# Patient Record
Sex: FEMALE | Race: WHITE
[De-identification: names, ages, dates, MRNs, and addresses within clinical notes are randomized per-mention and may not be internally consistent; named-entity substitution may affect disease eponyms.]

---

## 2017-06-19 ENCOUNTER — HOSPITAL ENCOUNTER (EMERGENCY)
Dept: HOSPITAL 41 - JD.ED | Age: 67
Discharge: HOME | End: 2017-06-19
Payer: MEDICARE

## 2017-06-19 VITALS — SYSTOLIC BLOOD PRESSURE: 100 MMHG | DIASTOLIC BLOOD PRESSURE: 70 MMHG

## 2017-06-19 DIAGNOSIS — F17.210: ICD-10-CM

## 2017-06-19 DIAGNOSIS — I50.9: ICD-10-CM

## 2017-06-19 DIAGNOSIS — F41.9: ICD-10-CM

## 2017-06-19 DIAGNOSIS — Z88.8: ICD-10-CM

## 2017-06-19 DIAGNOSIS — I95.1: Primary | ICD-10-CM

## 2017-06-19 DIAGNOSIS — I25.2: ICD-10-CM

## 2017-06-19 DIAGNOSIS — K21.9: ICD-10-CM

## 2017-06-19 DIAGNOSIS — Z79.82: ICD-10-CM

## 2017-06-19 DIAGNOSIS — M19.90: ICD-10-CM

## 2017-06-19 DIAGNOSIS — F32.9: ICD-10-CM

## 2017-06-19 DIAGNOSIS — J45.909: ICD-10-CM

## 2017-06-19 DIAGNOSIS — Z88.2: ICD-10-CM

## 2017-06-19 PROCEDURE — 36415 COLL VENOUS BLD VENIPUNCTURE: CPT

## 2017-06-19 PROCEDURE — 83880 ASSAY OF NATRIURETIC PEPTIDE: CPT

## 2017-06-19 PROCEDURE — 86140 C-REACTIVE PROTEIN: CPT

## 2017-06-19 PROCEDURE — 96375 TX/PRO/DX INJ NEW DRUG ADDON: CPT

## 2017-06-19 PROCEDURE — 80053 COMPREHEN METABOLIC PANEL: CPT

## 2017-06-19 PROCEDURE — 94664 DEMO&/EVAL PT USE INHALER: CPT

## 2017-06-19 PROCEDURE — 82803 BLOOD GASES ANY COMBINATION: CPT

## 2017-06-19 PROCEDURE — 96361 HYDRATE IV INFUSION ADD-ON: CPT

## 2017-06-19 PROCEDURE — 81001 URINALYSIS AUTO W/SCOPE: CPT

## 2017-06-19 PROCEDURE — 36600 WITHDRAWAL OF ARTERIAL BLOOD: CPT

## 2017-06-19 PROCEDURE — 85025 COMPLETE CBC W/AUTO DIFF WBC: CPT

## 2017-06-19 PROCEDURE — 71010: CPT

## 2017-06-19 PROCEDURE — 96374 THER/PROPH/DIAG INJ IV PUSH: CPT

## 2017-06-19 PROCEDURE — 83605 ASSAY OF LACTIC ACID: CPT

## 2017-06-19 PROCEDURE — 99285 EMERGENCY DEPT VISIT HI MDM: CPT

## 2017-06-19 PROCEDURE — 87040 BLOOD CULTURE FOR BACTERIA: CPT

## 2017-06-19 PROCEDURE — 93005 ELECTROCARDIOGRAM TRACING: CPT

## 2017-06-19 NOTE — EDM.PDOC
ED HPI GENERAL MEDICAL PROBLEM





- General


Chief Complaint: Respiratory Problem


Stated Complaint: SOB/WEAK/CONFUSED


Time Seen by Provider: 06/19/17 14:15


Source of Information: Reports: Patient, Family (daughter)


History Limitations: Reports: No Limitations





- History of Present Illness


INITIAL COMMENTS - FREE TEXT/NARRATIVE: 





66-year-old female presents with her daughter for evaluation and treatment of 

increased confusion, lethargy and malaise. Patient reports that she has not 

been feeling well since Friday. Daughter reports that she went to check on her 

today and she felt that she was more confused than normal lethargic and she had 

an increased effort of breathing. Patient states that she feels more short of 

breath than normal. She is normally on 3 L via nasal cannula of oxygen at home. 

She has end-stage COPD. Patient reports that she has been taking her nebulizers 

and breathing medications as prescribed. She states that she was recently 

started on spree that and Daliresp a few weeks ago. Patient denies any chest 

pain, fevers, coughing, or abdominal pain. Friday morning the patient did feel 

nauseous and had 1 episode of vomiting.





Patient has also had decreased urinary frequency. She has been drinking water. 

Daughter reports 2 weeks ago she may have drank out of a stagnant pond at home.





Patient is currently on narcotics for chronic low back and right hip pain. She 

feels that it is worsening. She is in a pain contract with a pain clinic. Next 

visit is on the 26. She has had previous epidural injections for her chronic 

low back and hip pain.





Daughter reports that she had an echocardiogram in February 2017. She had EF of 

45-55% at that time. In February she was hospitalized and intubated for 

pneumonia and exacerbation.


  ** Lower Back


Pain Score (Numeric/FACES): 3





- Related Data


 Allergies











Allergy/AdvReac Type Severity Reaction Status Date / Time


 


Sulfa (Sulfonamide Allergy  Cannot Verified 06/19/17 14:15





Antibiotics)   Remember  


 


sunflower oil Allergy  Anaphylactic Verified 06/19/17 14:15





   Shock  


 


sunflower seed Allergy  Hives Verified 06/19/17 14:15


 


alendronate sodium AdvReac  Nausea Verified 06/19/17 14:15





[From Fosamax]     


 


duloxetine HCl AdvReac  Nausea and Verified 06/19/17 14:15





[From Cymbalta]   Vomiting  


 


erythromycin base AdvReac  Nausea Verified 06/19/17 14:15


 


pregabalin [From Lyrica] AdvReac  Pain Verified 06/19/17 14:15











Home Meds: 


 Home Meds





ARIPiprazole [Abilify] 2 mg PO DAILY 11/12/15 [History]


Furosemide [Lasix] 40 mg PO BID 11/12/15 [History]


Albuterol Sulfate [Proair Respiclick] 2 puff INH Q4H PRN 06/06/16 [History]


predniSONE 5 mg PO DAILY 06/06/16 [History]


Escitalopram Oxalate 10 mg PO DAILY 06/16/16 [History]


Fluticasone/Salmeterol [Advair Diskus 500-50] 1 puff INH BID 06/16/16 [History]


Gabapentin [Neurontin] 300 mg PO TID 06/16/16 [History]


Aspirin/Caffeine [Yancy Back & Body Caplet] 2 tab PO Q6H 01/11/17 [History]


oxyCODONE 10 mg PO Q6H PRN 01/12/17 [History]


Acetaminophen [Tylenol Arthritis] 1,500 mg PO Q6H PRN #0  01/14/17 [Rx]


Albuterol/Ipratropium [DuoNeb 3.0-0.5 MG/3 ML] 1 inh NEB Q6H PRN 01/30/17 [

History]


Diclofenac Sodium [Voltaren 1% Gel] 1 applic TOP ASDIRECTED PRN 01/30/17 [

History]


Carvedilol [Coreg] 3.125 mg PO BIDMEALS 06/19/17 [History]


Metoclopramide HCl [Reglan] 5 mg PO Q8HR PRN #12 tablet 06/19/17 [Rx]


Metoprolol Succinate [Toprol XL] 25 mg PO DAILY 06/19/17 [History]


Nitroglycerin 0.4 mg .ROUTE ASDIRECTED PRN 06/19/17 [History]


Pantoprazole [ProTONIX***] 40 mg PO DAILY 06/19/17 [History]


Potassium Chloride 40 meq PO DAILY 06/19/17 [History]


Roflumilast [Daliresp] 500 mcg PO DAILY 06/19/17 [History]


Rosuvastatin [Crestor] 5 mg PO DAILY 06/19/17 [History]


Tiotropium [Spiriva HandiHaler] 18 mcg INH DAILY 06/19/17 [History]











Past Medical History


HEENT History: Reports: Other (See Below)


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


Cardiovascular History: Reports: Heart Failure, MI, Other (See Below)


Other Cardiovascular History: hypotension, cardiomegaly


Respiratory History: Reports: Asthma, COPD, Pneumonia, Recurrent, Other (See 

Below)


Other Respiratory History: wears oxygen at home 3L


Gastrointestinal History: Reports: Chronic Constipation, GERD


Genitourinary History: Reports: Other (See Below)


Other Genitourinary History: stress incontinence


OB/GYN History: Reports: Pregnancy


Musculoskeletal History: Reports: Osteoarthritis, Osteoporosis


Other Musculoskeletal History: 3 herniated disc to cervical spine C2 C3  C5 C6 

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

, lumbar degneration, wrist fracture with hardware


Neurological History: Reports: None


Psychiatric History: Reports: Addiction, Anxiety, Depression


Endocrine/Metabolic History: Reports: None


Hematologic History: Reports: Anemia


Immunologic History: Reports: None


Oncologic (Cancer) History: Reports: Other (See Below)


Other Oncologic History: vocal cord ca


Dermatologic History: Reports: Cellulitis





- Infectious Disease History


Infectious Disease History: Reports: C-Difficile, Shingles





- Past Surgical History


Female  Surgical History: Reports: None, Other (See Below)


Musculoskeletal Surgical History: Reports: Other (See Below)





Social & Family History





- Family History


Family Medical History: Noncontributory


Cardiac: Reports: Heart Failure, MI


Respiratory: Reports: COPD





- Tobacco Use


Smoking Status *Q: Current Every Day Smoker


Years of Tobacco use: 46


Packs/Tins Daily: 0.8


Used Tobacco, but Quit: No


Month Tobacco Last Used: 12/16


Second Hand Smoke Exposure: No





- Caffeine Use


Caffeine Use: Reports: Tea





- Recreational Drug Use


Recreational Drug Use: No





- Living Situation & Occupation


Living situation: Reports: Other


Occupation: Retired





ED ROS GENERAL





- Review of Systems


Review Of Systems: See Below


Constitutional: Reports: Weakness, Fatigue, Other (increased confusion).  Denies

: Fever


Respiratory: Reports: Shortness of Breath.  Denies: Cough


Cardiovascular: Denies: Chest Pain


GI/Abdominal: Reports: Nausea, Vomiting (x1 episode ).  Denies: Abdominal Pain


: Denies: Dysuria, Frequency


Musculoskeletal: Reports: Back Pain, Other (chronic hip pain)





ED EXAM, GENERAL





- Physical Exam


Exam: See Below


Exam Limited By: No Limitations


General Appearance: Alert, WD/WN, Mild Distress, Thin


Ears: Normal External Exam


Nose: Normal Inspection.  No: Nasal Flaring


Throat/Mouth: Normal Inspection, Normal Lips, Normal Oropharynx, Normal Voice, 

No Airway Compromise.  No: Perioral Cyanosis


Neck: Normal Inspection


Respiratory/Chest: Decreased Breath Sounds (throughout), Wheezing (diffuse 

expiratory).  No: Crackles, Rhonchi


Cardiovascular: Normal Peripheral Pulses, Regular Rate, Rhythm


GI/Abdominal: Soft, Non-Tender


Neurological: Alert, Oriented, Normal Cognition


Psychiatric: Normal Affect, Normal Mood


Skin Exam: Warm, Dry, Normal Color





EKG INTERPRETATION


EKG Date: 06/19/17


Time: 15:05


Rhythm: NSR


Rate (Beats/Min): 80


Axis: Normal


P-Wave: Present


QRS: Normal


ST-T: Normal


QT: Normal


EKG Interpretation Comments: 





NSR at 80 bpm.


Near Q waves in V1 and V2 - consider old anteroseptal MI. 


Borderline LVH pattern. 


Nonspecific interventricular conduction delay.


Reviewed by myself and Dr. Mendez. 





Course





- Vital Signs


Last Recorded V/S: 


 Last Vital Signs











Temp  36.5 C   06/19/17 14:11


 


Pulse  82   06/19/17 19:29


 


Resp  18   06/19/17 19:29


 


BP  100/70   06/19/17 19:29


 


Pulse Ox  98   06/19/17 19:29








 





Orthostatic Blood Pressure [     85/64


Standing]                        


Orthostatic Blood Pressure [     89/56


Sitting]                         


Orthostatic Blood Pressure [     103/63


Supine]                          











- Orders/Labs/Meds


Labs: 


 Laboratory Tests











  06/19/17 06/19/17 06/19/17 Range/Units





  15:10 15:10 15:10 


 


WBC  7.59    (3.98-10.04)  K/mm3


 


RBC  4.35    (3.98-5.22)  M/mm3


 


Hgb  12.3    (11.2-15.7)  gm/L


 


Hct  39.8    (34.1-44.9)  %


 


MCV  91.5    (79.4-94.8)  fl


 


MCH  28.3    (25.6-32.2)  pg


 


MCHC  30.9 L    (32.2-35.5)  g/dl


 


RDW Std Deviation  51.4 H    (36.4-46.3)  fL


 


Plt Count  357    (182-369)  K/mm3


 


MPV  9.6    (9.4-12.3)  fl


 


Neut % (Auto)  67.2    (34.0-71.1)  %


 


Lymph % (Auto)  21.1    (19.3-51.7)  %


 


Mono % (Auto)  9.4    (4.7-12.5)  %


 


Eos % (Auto)  1.7    (0.7-5.8)  


 


Baso % (Auto)  0.3    (0.1-1.2)  %


 


Neut # (Auto)  5.11    (1.56-6.13)  K/mm3


 


Lymph # (Auto)  1.60    (1.18-3.74)  K/mm3


 


Mono # (Auto)  0.71 H    (0.24-0.36)  K/mm3


 


Eos # (Auto)  0.13    (0.04-0.36)  K/mm3


 


Baso # (Auto)  0.02    (0.01-0.08)  K/mm3


 


Puncture Site     


 


ABG pH     (7.35-7.45)  


 


ABG pCO2     (35.0-45.0)  mmHg


 


ABG pO2     (80.0-100.0)  mmHg


 


ABG HCO3     (22.0-26.0)  meq/L


 


ABG O2 Saturation     (96.0-97.0)  %


 


ABG Base Excess     (-2-2.0)  


 


A-a Gradient     mmHg


 


O2 Delivery Device     


 


Oxygen Flow Rate     


 


FiO2     (21..00)  %


 


Sodium   142   (136-145)  mEq/L


 


Potassium   3.5   (3.5-5.1)  mEq/L


 


Chloride   101   ()  mEq/L


 


Carbon Dioxide   33 H   (21-32)  mEq/L


 


Anion Gap   11.5   (5-15)  


 


BUN   16   (7-18)  mg/dL


 


Creatinine   0.7   (0.55-1.02)  mg/dL


 


Est Cr Clr Drug Dosing   61.98   mL/min


 


Estimated GFR (MDRD)   > 60   (>60)  mL/min


 


BUN/Creatinine Ratio   22.9 H   (14-18)  


 


Glucose   86   ()  mg/dL


 


Lactic Acid    0.7  (0.4-2.0)  mmol/L


 


Calcium   8.7   (8.5-10.1)  mg/dL


 


Total Bilirubin   0.2   (0.2-1.0)  mg/dL


 


AST   29   (15-37)  U/L


 


ALT   34   (14-59)  U/L


 


Alkaline Phosphatase   53   ()  U/L


 


C-Reactive Protein   0.6   (<1.0)  mg/dL


 


B-Natriuretic Peptide     (0-100)  pg/mL


 


Total Protein   7.4   (6.4-8.2)  g/dl


 


Albumin   3.9   (3.4-5.0)  g/dl


 


Globulin   3.5   gm/dL


 


Albumin/Globulin Ratio   1.1   (1-2)  


 


Urine Color     (Yellow)  


 


Urine Appearance     (Clear)  


 


Urine pH     (5.0-8.0)  


 


Ur Specific Gravity     (1.005-1.030)  


 


Urine Protein     (Negative)  


 


Urine Glucose (UA)     (Negative)  


 


Urine Ketones     (Negative)  


 


Urine Occult Blood     (Negative)  


 


Urine Nitrite     (Negative)  


 


Urine Bilirubin     (Negative)  


 


Urine Urobilinogen     (0.2-1.0)  


 


Ur Leukocyte Esterase     (Negative)  


 


Urine RBC     (0-5)  /hpf


 


Urine WBC     (0-5)  /hpf


 


Ur Epithelial Cells     (0-5)  /hpf


 


Urine Bacteria     (FEW)  /hpf


 


Urine Mucus     (FEW)  /hpf














  06/19/17 06/19/17 06/19/17 Range/Units





  15:10 15:15 15:40 


 


WBC     (3.98-10.04)  K/mm3


 


RBC     (3.98-5.22)  M/mm3


 


Hgb     (11.2-15.7)  gm/L


 


Hct     (34.1-44.9)  %


 


MCV     (79.4-94.8)  fl


 


MCH     (25.6-32.2)  pg


 


MCHC     (32.2-35.5)  g/dl


 


RDW Std Deviation     (36.4-46.3)  fL


 


Plt Count     (182-369)  K/mm3


 


MPV     (9.4-12.3)  fl


 


Neut % (Auto)     (34.0-71.1)  %


 


Lymph % (Auto)     (19.3-51.7)  %


 


Mono % (Auto)     (4.7-12.5)  %


 


Eos % (Auto)     (0.7-5.8)  


 


Baso % (Auto)     (0.1-1.2)  %


 


Neut # (Auto)     (1.56-6.13)  K/mm3


 


Lymph # (Auto)     (1.18-3.74)  K/mm3


 


Mono # (Auto)     (0.24-0.36)  K/mm3


 


Eos # (Auto)     (0.04-0.36)  K/mm3


 


Baso # (Auto)     (0.01-0.08)  K/mm3


 


Puncture Site   Rt radial   


 


ABG pH   7.36   (7.35-7.45)  


 


ABG pCO2   44.7   (35.0-45.0)  mmHg


 


ABG pO2   68.0 L   (80.0-100.0)  mmHg


 


ABG HCO3   24.6   (22.0-26.0)  meq/L


 


ABG O2 Saturation   94.0 L   (96.0-97.0)  %


 


ABG Base Excess   -0.5   (-2-2.0)  


 


A-a Gradient   81   mmHg


 


O2 Delivery Device   Nasal cannula   


 


Oxygen Flow Rate   3.0   


 


FiO2   0.00 L   (21..00)  %


 


Sodium     (136-145)  mEq/L


 


Potassium     (3.5-5.1)  mEq/L


 


Chloride     ()  mEq/L


 


Carbon Dioxide     (21-32)  mEq/L


 


Anion Gap     (5-15)  


 


BUN     (7-18)  mg/dL


 


Creatinine     (0.55-1.02)  mg/dL


 


Est Cr Clr Drug Dosing     mL/min


 


Estimated GFR (MDRD)     (>60)  mL/min


 


BUN/Creatinine Ratio     (14-18)  


 


Glucose     ()  mg/dL


 


Lactic Acid     (0.4-2.0)  mmol/L


 


Calcium     (8.5-10.1)  mg/dL


 


Total Bilirubin     (0.2-1.0)  mg/dL


 


AST     (15-37)  U/L


 


ALT     (14-59)  U/L


 


Alkaline Phosphatase     ()  U/L


 


C-Reactive Protein     (<1.0)  mg/dL


 


B-Natriuretic Peptide  < 15    (0-100)  pg/mL


 


Total Protein     (6.4-8.2)  g/dl


 


Albumin     (3.4-5.0)  g/dl


 


Globulin     gm/dL


 


Albumin/Globulin Ratio     (1-2)  


 


Urine Color    Yellow  (Yellow)  


 


Urine Appearance    Clear  (Clear)  


 


Urine pH    5.5  (5.0-8.0)  


 


Ur Specific Gravity    1.025  (1.005-1.030)  


 


Urine Protein    Negative  (Negative)  


 


Urine Glucose (UA)    Negative  (Negative)  


 


Urine Ketones    1+ H  (Negative)  


 


Urine Occult Blood    Trace-intact H  (Negative)  


 


Urine Nitrite    Negative  (Negative)  


 


Urine Bilirubin    Negative  (Negative)  


 


Urine Urobilinogen    0.2  (0.2-1.0)  


 


Ur Leukocyte Esterase    Negative  (Negative)  


 


Urine RBC    0-5  (0-5)  /hpf


 


Urine WBC    0-5  (0-5)  /hpf


 


Ur Epithelial Cells    0-5  (0-5)  /hpf


 


Urine Bacteria    Not seen  (FEW)  /hpf


 


Urine Mucus    Not seen  (FEW)  /hpf











Meds: 


Medications














Discontinued Medications














Generic Name Dose Route Start Last Admin





  Trade Name Freq  PRN Reason Stop Dose Admin


 


Albuterol/Ipratropium  3 ml  06/19/17 14:41  





  Duoneb 3.0-0.5 Mg/3 Ml  NEB  06/19/17 14:42  





  ONETIME ONE   


 


Hydromorphone HCl  1 mg  06/19/17 15:50  06/19/17 16:07





  Dilaudid  IVPUSH  06/19/17 15:51  Not Given





  ONETIME ONE   


 


Hydromorphone HCl  0.5 mg  06/19/17 15:57  06/19/17 16:01





  Dilaudid  IVPUSH  06/19/17 15:58  0.5 mg





  ONETIME ONE   Administration


 


Sodium Chloride  500 mls @ 500 mls/hr  06/19/17 14:41  06/19/17 15:08





  Normal Saline  IV  06/19/17 15:40  500 mls/hr





  ONETIME ONE   Administration


 


Sodium Chloride  1,000 mls @ 999 mls/hr  06/19/17 15:59  06/19/17 16:06





  Normal Saline  IV  06/19/17 16:59  999 mls/hr





  ONETIME ONE   Administration


 


Ondansetron HCl  4 mg  06/19/17 17:53  06/19/17 18:05





  Zofran  IVPUSH  06/19/17 17:54  4 mg





  ONETIME ONE   Administration


 


Sodium Chloride  10 ml  06/19/17 14:41  06/19/17 15:08





  Saline Flush  FLUSH   10 ml





  ASDIRECTED PRN   Administration





  Keep Vein Open   














- Radiology Interpretation


Free Text/Narrative:: 





chest xray shows hyperinflation. No acute changes. Reviewed by myself and Dr. Mendez





- Re-Assessments/Exams


Free Text/Narrative Re-Assessment/Exam: 





06/19/17 18:11


Labs returned.


WBC is 7.59, hgb is 12.3 and plts are 357


pH is 7.36, pO2 is 68, pCO2 is 44.7 and bicarb is 24.6


sodium is 142, potassium is 3.5, chloride is 101. Anion gap is 11.5. 


BNP is <15


CRP is 0.6


UA has 1+ ketones and trace intact blood





I discussed the case with Dr. Mendez. Recommended follow-up with PCP and 

pulmonology.





I discussed labs and imaging with the patient.





At this point I do not see any reason for admission. The patient has done well 

on 3 L via nasal cannula. Her lab studies are unremarkable. I discussed with 

her daughter options. If she is uncomfortable taking her home we could offer 

admission however, it may be for an observation admission. She would also 

likely be encouraged to go to a nursing home as she is unable to care for 

himself at home. Daughter does not want this and will take her home. I 

encouraged him to contact her pulmonologist and her pain clinic. I recommend 

they discuss her new medication changes. Her symptoms today could be from her 

recent medication changes.





Discharge instructions as documented.














Departure





- Departure


Time of Disposition: 18:11


Disposition: Home, Self-Care 01


Condition: Fair


Clinical Impression: 


 Orthostatic hypotension








- Discharge Information


Prescriptions: 


Metoclopramide HCl [Reglan] 5 mg PO Q8HR PRN #12 tablet


 PRN Reason: Nausea


Instructions:  Orthostatic Hypotension


Referrals: 


Nancy Gibbons NP [Primary Care Provider] - 


Forms:  ED Department Discharge


Additional Instructions: 


Close follow-up with your primary care provider Wednesday or Thursday this 

week. 





Reglan 1 tab every 8 hours as needed for nausea.





Rest.





Stop the Coreg.





Please return to the ER if your symptoms change or worsen.

## 2017-06-20 NOTE — CR
Chest: Portable view of the chest was obtained.

 

Comparison: Previous chest x-ray of 02/13/17.

 

Heart size and mediastinum are within normal limits.  Focal density 

noted within the right upper lung believed to represent an area of 

scarring.  Lungs otherwise are clear but somewhat hyperinflated.  Bony

 structures are osteopenic but grossly intact.  Previous 

cholecystectomy is noted.  Probable emphysematous change is present.

 

Impression:

1.  Density within the right upper chest most likely representing area

 of scarring.  Probable emphysematous change.

2.  Other incidental findings.  Nothing acute is definitely 

appreciated.

 

Diagnostic code #3

## 2017-07-28 ENCOUNTER — HOSPITAL ENCOUNTER (EMERGENCY)
Dept: HOSPITAL 41 - JD.ED | Age: 67
Discharge: HOME | End: 2017-07-28
Payer: MEDICARE

## 2017-07-28 VITALS — SYSTOLIC BLOOD PRESSURE: 120 MMHG | DIASTOLIC BLOOD PRESSURE: 75 MMHG

## 2017-07-28 DIAGNOSIS — Z88.1: ICD-10-CM

## 2017-07-28 DIAGNOSIS — I25.2: ICD-10-CM

## 2017-07-28 DIAGNOSIS — Z79.899: ICD-10-CM

## 2017-07-28 DIAGNOSIS — F32.9: ICD-10-CM

## 2017-07-28 DIAGNOSIS — Z91.018: ICD-10-CM

## 2017-07-28 DIAGNOSIS — Z88.8: ICD-10-CM

## 2017-07-28 DIAGNOSIS — F17.210: ICD-10-CM

## 2017-07-28 DIAGNOSIS — Z88.2: ICD-10-CM

## 2017-07-28 DIAGNOSIS — Z79.82: ICD-10-CM

## 2017-07-28 DIAGNOSIS — K21.9: ICD-10-CM

## 2017-07-28 DIAGNOSIS — M19.90: ICD-10-CM

## 2017-07-28 DIAGNOSIS — F41.9: ICD-10-CM

## 2017-07-28 DIAGNOSIS — J44.1: Primary | ICD-10-CM

## 2017-07-28 DIAGNOSIS — I50.9: ICD-10-CM

## 2017-07-28 DIAGNOSIS — M81.0: ICD-10-CM

## 2017-07-28 DIAGNOSIS — J45.909: ICD-10-CM

## 2017-07-28 PROCEDURE — 94664 DEMO&/EVAL PT USE INHALER: CPT

## 2017-07-28 PROCEDURE — 80053 COMPREHEN METABOLIC PANEL: CPT

## 2017-07-28 PROCEDURE — 83880 ASSAY OF NATRIURETIC PEPTIDE: CPT

## 2017-07-28 PROCEDURE — 86140 C-REACTIVE PROTEIN: CPT

## 2017-07-28 PROCEDURE — 71010: CPT

## 2017-07-28 PROCEDURE — 96365 THER/PROPH/DIAG IV INF INIT: CPT

## 2017-07-28 PROCEDURE — 36415 COLL VENOUS BLD VENIPUNCTURE: CPT

## 2017-07-28 PROCEDURE — 96375 TX/PRO/DX INJ NEW DRUG ADDON: CPT

## 2017-07-28 PROCEDURE — 85025 COMPLETE CBC W/AUTO DIFF WBC: CPT

## 2017-07-28 PROCEDURE — 94640 AIRWAY INHALATION TREATMENT: CPT

## 2017-07-28 PROCEDURE — 99285 EMERGENCY DEPT VISIT HI MDM: CPT

## 2017-07-28 NOTE — EDM.PDOC
ED HPI GENERAL MEDICAL PROBLEM





- General


Chief Complaint: General


Stated Complaint: respiratory PROBLEMS


Time Seen by Provider: 07/28/17 00:54


Source of Information: Reports: Patient, RN Notes Reviewed





- History of Present Illness


INITIAL COMMENTS - FREE TEXT/NARRATIVE: 





67-year-old male by daughter for evaluation of shortness of breath, coughing, 

generalized weakness. She does have known long-standing history of COPD. Her 

daughter tells me that she has started smoking again. Patient states that the 

cough is worsened over the last few days. She is now coughing up yellowish 

colored phlegm. She is not aware of running fever. She does have a nebulizer at 

home but states she has not been using that regularly. She states she has not 

used it in the past day. She denies sore throat other than the discomfort of 

coughing. She denies major nasal or sinus congestion at this time. She's had 

decreased appetite. No current abdominal pain or vomiting. No leg swelling or 

discomfort.





- Related Data


 Allergies











Allergy/AdvReac Type Severity Reaction Status Date / Time


 


Sulfa (Sulfonamide Allergy  Cannot Verified 07/28/17 00:42





Antibiotics)   Remember  


 


sunflower oil Allergy  Anaphylactic Verified 07/28/17 00:42





   Shock  


 


sunflower seed Allergy  Hives Verified 07/28/17 00:42


 


alendronate sodium AdvReac  Nausea Verified 07/28/17 00:42





[From Fosamax]     


 


duloxetine HCl AdvReac  Nausea and Verified 07/28/17 00:42





[From Cymbalta]   Vomiting  


 


erythromycin base AdvReac  Nausea Verified 07/28/17 00:42


 


pregabalin [From Lyrica] AdvReac  Pain Verified 07/28/17 00:42











Home Meds: 


 Home Meds





ARIPiprazole [Abilify] 2 mg PO DAILY 11/12/15 [History]


Furosemide [Lasix] 40 mg PO BID 11/12/15 [History]


Albuterol Sulfate [Proair Respiclick] 2 puff INH Q4H PRN 06/06/16 [History]


predniSONE 5 mg PO DAILY 06/06/16 [History]


Escitalopram Oxalate 10 mg PO DAILY 06/16/16 [History]


Fluticasone/Salmeterol [Advair Diskus 500-50] 1 puff INH BID 06/16/16 [History]


Gabapentin [Neurontin] 300 mg PO TID 06/16/16 [History]


Aspirin/Caffeine [Yancy Back & Body Caplet] 2 tab PO Q6H 01/11/17 [History]


oxyCODONE 10 mg PO Q6H PRN 01/12/17 [History]


Acetaminophen [Tylenol Arthritis] 1,500 mg PO Q6H PRN #0  01/14/17 [Rx]


Albuterol/Ipratropium [DuoNeb 3.0-0.5 MG/3 ML] 1 inh NEB Q6H PRN 01/30/17 [

History]


Diclofenac Sodium [Voltaren 1% Gel] 1 applic TOP ASDIRECTED PRN 01/30/17 [

History]


Carvedilol [Coreg] 3.125 mg PO BIDMEALS 06/19/17 [History]


Metoclopramide HCl [Reglan] 5 mg PO Q8HR PRN #12 tablet 06/19/17 [Rx]


Metoprolol Succinate [Toprol XL] 25 mg PO DAILY 06/19/17 [History]


Nitroglycerin 0.4 mg .ROUTE ASDIRECTED PRN 06/19/17 [History]


Pantoprazole [ProTONIX***] 40 mg PO DAILY 06/19/17 [History]


Potassium Chloride 40 meq PO DAILY 06/19/17 [History]


Roflumilast [Daliresp] 500 mcg PO DAILY 06/19/17 [History]


Rosuvastatin [Crestor] 5 mg PO DAILY 06/19/17 [History]


Tiotropium [Spiriva HandiHaler] 18 mcg INH DAILY 06/19/17 [History]


Levofloxacin [Levaquin] 500 mg PO Q24H #7 tablet 07/28/17 [Rx]











Past Medical History


HEENT History: Reports: Other (See Below)


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


Cardiovascular History: Reports: Heart Failure, MI, Other (See Below)


Other Cardiovascular History: hypotension, cardiomegaly


Respiratory History: Reports: Asthma, COPD, Pneumonia, Recurrent, Other (See 

Below)


Other Respiratory History: wears oxygen at home 3L


Gastrointestinal History: Reports: Chronic Constipation, GERD


Genitourinary History: Reports: Other (See Below)


Other Genitourinary History: stress incontinence


OB/GYN History: Reports: Pregnancy


Musculoskeletal History: Reports: Osteoarthritis, Osteoporosis


Other Musculoskeletal History: 3 herniated disc to cervical spine C2 C3  C5 C6 

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

, lumbar degneration, wrist fracture with hardware


Neurological History: Reports: None


Psychiatric History: Reports: Addiction, Anxiety, Depression


Endocrine/Metabolic History: Reports: None


Hematologic History: Reports: Anemia


Immunologic History: Reports: None


Oncologic (Cancer) History: Reports: Other (See Below)


Other Oncologic History: vocal cord ca


Dermatologic History: Reports: Cellulitis





- Infectious Disease History


Infectious Disease History: Reports: C-Difficile, Shingles





- Past Surgical History


Female  Surgical History: Reports: None, Other (See Below)


Musculoskeletal Surgical History: Reports: Other (See Below)





Social & Family History





- Family History


Family Medical History: Noncontributory


Cardiac: Reports: Heart Failure, MI


Respiratory: Reports: COPD





- Tobacco Use


Smoking Status *Q: Current Every Day Smoker


Years of Tobacco use: 35


Packs/Tins Daily: 1


Used Tobacco, but Quit: No


Month Tobacco Last Used: 12/16


Second Hand Smoke Exposure: No





- Caffeine Use


Caffeine Use: Reports: Tea





- Recreational Drug Use


Recreational Drug Use: No





- Living Situation & Occupation


Living situation: Reports: Other


Occupation: Retired





ED ROS GENERAL





- Review of Systems


Review Of Systems: See Below


Constitutional: Denies: Fever, Chills, Diaphoresis


HEENT: Denies: Sinus Problem, Throat Pain


Respiratory: Reports: Shortness of Breath, Wheezing, Cough, Sputum (Yellow 

colored).  Denies: Pleuritic Chest Pain


Cardiovascular: Reports: Chest Pain (With coughing)


Endocrine: Reports: Fatigue


GI/Abdominal: Reports: Decreased Appetite.  Denies: Abdominal Pain, Nausea, 

Vomiting


Musculoskeletal: Reports: No Symptoms


Skin: Reports: No Symptoms


Neurological: Reports: Weakness (Generalized).  Denies: Trouble Speaking





ED EXAM, GENERAL





- Physical Exam


Exam: See Below


General Appearance: Alert, No Apparent Distress


Eye Exam: Bilateral Eye: PERRL


Nose: Normal Inspection


Throat/Mouth: Normal Inspection, Normal Oropharynx


Head: Atraumatic.  No: Facial Swelling


Neck: Supple, Full Range of Motion, Other (No JVD)


Respiratory/Chest: Respiratory Distress (Mild tachypnea), Wheezing (Mild 

bilateral).  No: Rales, Rhonchi


Cardiovascular: Regular Rate, Rhythm


GI/Abdominal: Soft, Non-Tender


Back Exam: No: CVA Tenderness (L), CVA Tenderness (R)


Extremities: No: Pedal Edema, Leg Pain


Neurological: Alert, Oriented, No Motor/Sensory Deficits


Skin Exam: Warm, Dry, Normal Color





Course





- Vital Signs


Last Recorded V/S: 


 Last Vital Signs











Temp  97.0 F   07/28/17 00:43


 


Pulse  90   07/28/17 08:00


 


Resp  18   07/28/17 08:00


 


BP  120/75   07/28/17 08:00


 


Pulse Ox  96   07/28/17 08:00














- Orders/Labs/Meds


Labs: 


 Laboratory Tests











  07/28/17 07/28/17 07/28/17 Range/Units





  01:29 01:29 02:10 


 


WBC  13.13 H    (3.98-10.04)  K/mm3


 


RBC  4.03    (3.98-5.22)  M/mm3


 


Hgb  11.3    (11.2-15.7)  gm/L


 


Hct  36.8    (34.1-44.9)  %


 


MCV  91.3    (79.4-94.8)  fl


 


MCH  28.0    (25.6-32.2)  pg


 


MCHC  30.7 L    (32.2-35.5)  g/dl


 


RDW Std Deviation  52.3 H    (36.4-46.3)  fL


 


Plt Count  323    (182-369)  K/mm3


 


MPV  9.7    (9.4-12.3)  fl


 


Neut % (Auto)  81.7 H    (34.0-71.1)  %


 


Lymph % (Auto)  8.8 L    (19.3-51.7)  %


 


Mono % (Auto)  6.9    (4.7-12.5)  %


 


Eos % (Auto)  2.2    (0.7-5.8)  


 


Baso % (Auto)  0.2    (0.1-1.2)  %


 


Neut # (Auto)  10.73 H    (1.56-6.13)  K/mm3


 


Lymph # (Auto)  1.16 L    (1.18-3.74)  K/mm3


 


Mono # (Auto)  0.90 H    (0.24-0.36)  K/mm3


 


Eos # (Auto)  0.29    (0.04-0.36)  K/mm3


 


Baso # (Auto)  0.02    (0.01-0.08)  K/mm3


 


Manual Slide Review  Abnormal smear    


 


Sodium     (136-145)  mEq/L


 


Potassium     (3.5-5.1)  mEq/L


 


Chloride     ()  mEq/L


 


Carbon Dioxide     (21-32)  mEq/L


 


Anion Gap     (5-15)  


 


BUN     (7-18)  mg/dL


 


Creatinine     (0.55-1.02)  mg/dL


 


Est Cr Clr Drug Dosing     


 


Estimated GFR (MDRD)     (>60)  mL/min


 


BUN/Creatinine Ratio     (14-18)  


 


Glucose     ()  mg/dL


 


Calcium     (8.5-10.1)  mg/dL


 


Total Bilirubin     (0.2-1.0)  mg/dL


 


AST     (15-37)  U/L


 


ALT     (14-59)  U/L


 


Alkaline Phosphatase     ()  U/L


 


C-Reactive Protein    19.2 H*  (<1.0)  mg/dL


 


B-Natriuretic Peptide   < 15   (0-100)  pg/mL


 


Total Protein     (6.4-8.2)  g/dl


 


Albumin     (3.4-5.0)  g/dl


 


Globulin     gm/dL


 


Albumin/Globulin Ratio     (1-2)  














  07/28/17 Range/Units





  02:10 


 


WBC   (3.98-10.04)  K/mm3


 


RBC   (3.98-5.22)  M/mm3


 


Hgb   (11.2-15.7)  gm/L


 


Hct   (34.1-44.9)  %


 


MCV   (79.4-94.8)  fl


 


MCH   (25.6-32.2)  pg


 


MCHC   (32.2-35.5)  g/dl


 


RDW Std Deviation   (36.4-46.3)  fL


 


Plt Count   (182-369)  K/mm3


 


MPV   (9.4-12.3)  fl


 


Neut % (Auto)   (34.0-71.1)  %


 


Lymph % (Auto)   (19.3-51.7)  %


 


Mono % (Auto)   (4.7-12.5)  %


 


Eos % (Auto)   (0.7-5.8)  


 


Baso % (Auto)   (0.1-1.2)  %


 


Neut # (Auto)   (1.56-6.13)  K/mm3


 


Lymph # (Auto)   (1.18-3.74)  K/mm3


 


Mono # (Auto)   (0.24-0.36)  K/mm3


 


Eos # (Auto)   (0.04-0.36)  K/mm3


 


Baso # (Auto)   (0.01-0.08)  K/mm3


 


Manual Slide Review   


 


Sodium  140  (136-145)  mEq/L


 


Potassium  4.3  (3.5-5.1)  mEq/L


 


Chloride  100  ()  mEq/L


 


Carbon Dioxide  34 H  (21-32)  mEq/L


 


Anion Gap  10.3  (5-15)  


 


BUN  22 H  (7-18)  mg/dL


 


Creatinine  0.6  (0.55-1.02)  mg/dL


 


Est Cr Clr Drug Dosing  TNP  


 


Estimated GFR (MDRD)  > 60  (>60)  mL/min


 


BUN/Creatinine Ratio  36.7 H  (14-18)  


 


Glucose  104  ()  mg/dL


 


Calcium  8.9  (8.5-10.1)  mg/dL


 


Total Bilirubin  0.2  (0.2-1.0)  mg/dL


 


AST  17  (15-37)  U/L


 


ALT  24  (14-59)  U/L


 


Alkaline Phosphatase  63  ()  U/L


 


C-Reactive Protein   (<1.0)  mg/dL


 


B-Natriuretic Peptide   (0-100)  pg/mL


 


Total Protein  7.4  (6.4-8.2)  g/dl


 


Albumin  3.7  (3.4-5.0)  g/dl


 


Globulin  3.7  gm/dL


 


Albumin/Globulin Ratio  1.0  (1-2)  











Meds: 


Medications














Discontinued Medications














Generic Name Dose Route Start Last Admin





  Trade Name Freq  PRN Reason Stop Dose Admin


 


Acetaminophen  975 mg  07/28/17 06:49  07/28/17 06:57





  Tylenol  PO  07/28/17 06:50  975 mg





  NOW ONE   Administration


 


Albuterol  2.5 mg  07/28/17 06:37  07/28/17 06:51





  Proventil Neb Soln  [facility]  07/28/17 06:38  2.5 mg





  ONETIME ONE   Administration


 


Albuterol/Ipratropium  3 ml  07/28/17 01:20  07/28/17 01:34





  Duoneb 3.0-0.5 Mg/3 Ml  NEB  07/28/17 01:21  3 ml





  ONETIME ONE   Administration


 


Levofloxacin/Dextrose 750 mg/  150 mls @ 100 mls/hr  07/28/17 03:04  07/28/17 03

:46





  Premix  IV  07/28/17 04:33  100 mls/hr





  ONETIME ONE   Administration


 


Methylprednisolone Sodium Succinate  125 mg  07/28/17 01:20  07/28/17 01:31





  Solu-Medrol  IVPUSH  07/28/17 01:21  125 mg





  ONETIME ONE   Administration


 


Prednisone  40 mg  07/28/17 06:53  





  Prednisone  PO  07/28/17 06:54  





  NOW ONE   


 


Prednisone  40 mg  07/28/17 06:56  07/28/17 06:57





  Prednisone  PO  07/28/17 06:57  40 mg





  ONETIME ONE   Administration


 


Prednisone  Confirm  07/28/17 06:57  





  Prednisone  Administered  07/28/17 06:58  





  Dose   





  40 mg   





  .ROUTE   





  .STK-MED ONE   


 


Sodium Chloride  10 ml  07/28/17 01:14  07/28/17 01:31





  Saline Flush  FLUSH   10 ml





  ASDIRECTED PRN   Administration





  Keep Vein Open   














- Re-Assessments/Exams


Free Text/Narrative Re-Assessment/Exam: 





07/28/17 02:47


Chest x-ray shows some mild hyperinflation, no apparent acute infiltrate, lung 

markings similar to prior chest x-ray on record





Departure





- Departure


Time of Disposition: 08:00


Disposition: Home, Self-Care 01


Condition: Fair


Clinical Impression: 


 COPD exacerbation








- Discharge Information


Prescriptions: 


Levofloxacin [Levaquin] 500 mg PO Q24H #7 tablet


Instructions:  Chronic Obstructive Pulmonary Disease Exacerbation


Referrals: 


Nancy Gibbons NP [Primary Care Provider] - 


Forms:  ED Department Discharge


Additional Instructions: 


You need to try hard to stop smoking, you have been given a dose of Levaquin 

750 mg IV while here in the emergency department. Continue that 500 mg daily 

for the next week. Your next dose of Levaquin should be tomorrow morning.  

Prednisone 40 mg every morning for 3 days and then 20 mg every morning for 3 

days. Follow-up with your regular medical provider in about one week for 

recheck. Return to ED if symptoms worsening in any way

## 2017-07-28 NOTE — CR
Chest: Portable view of the chest was obtained.

 

Comparison: Previous chest x-ray of 06/19/17.

 

Heart size and mediastinum are normal.  Small parenchymal density 

noted within the right upper lung most likely due to an area of 

scarring which is similar to previous exam.  Lungs otherwise are 

clear.  Bony structures are grossly intact.

 

Impression:

1.  Stable area of parenchymal scarring within the right upper lung.

2.  Nothing acute is appreciated on portable chest x-ray.

 

Diagnostic code #2

## 2017-09-08 ENCOUNTER — HOSPITAL ENCOUNTER (EMERGENCY)
Dept: HOSPITAL 41 - JD.ED | Age: 67
Discharge: HOME | End: 2017-09-08
Payer: MEDICARE

## 2017-09-08 VITALS — SYSTOLIC BLOOD PRESSURE: 114 MMHG | DIASTOLIC BLOOD PRESSURE: 56 MMHG

## 2017-09-08 DIAGNOSIS — R60.0: Primary | ICD-10-CM

## 2017-09-08 DIAGNOSIS — M81.0: ICD-10-CM

## 2017-09-08 DIAGNOSIS — I25.2: ICD-10-CM

## 2017-09-08 DIAGNOSIS — Z88.1: ICD-10-CM

## 2017-09-08 DIAGNOSIS — J44.1: ICD-10-CM

## 2017-09-08 DIAGNOSIS — Z99.81: ICD-10-CM

## 2017-09-08 DIAGNOSIS — F32.9: ICD-10-CM

## 2017-09-08 DIAGNOSIS — Z79.899: ICD-10-CM

## 2017-09-08 DIAGNOSIS — J98.4: ICD-10-CM

## 2017-09-08 DIAGNOSIS — M19.90: ICD-10-CM

## 2017-09-08 DIAGNOSIS — Z86.2: ICD-10-CM

## 2017-09-08 DIAGNOSIS — Z91.018: ICD-10-CM

## 2017-09-08 DIAGNOSIS — Z87.891: ICD-10-CM

## 2017-09-08 DIAGNOSIS — Z88.8: ICD-10-CM

## 2017-09-08 DIAGNOSIS — Z88.2: ICD-10-CM

## 2017-09-08 DIAGNOSIS — I50.9: ICD-10-CM

## 2017-09-08 DIAGNOSIS — K21.9: ICD-10-CM

## 2017-09-08 PROCEDURE — 83880 ASSAY OF NATRIURETIC PEPTIDE: CPT

## 2017-09-08 PROCEDURE — 86140 C-REACTIVE PROTEIN: CPT

## 2017-09-08 PROCEDURE — 71250 CT THORAX DX C-: CPT

## 2017-09-08 PROCEDURE — 81001 URINALYSIS AUTO W/SCOPE: CPT

## 2017-09-08 PROCEDURE — 82553 CREATINE MB FRACTION: CPT

## 2017-09-08 PROCEDURE — 94664 DEMO&/EVAL PT USE INHALER: CPT

## 2017-09-08 PROCEDURE — 85025 COMPLETE CBC W/AUTO DIFF WBC: CPT

## 2017-09-08 PROCEDURE — 93005 ELECTROCARDIOGRAM TRACING: CPT

## 2017-09-08 PROCEDURE — 80053 COMPREHEN METABOLIC PANEL: CPT

## 2017-09-08 PROCEDURE — 99285 EMERGENCY DEPT VISIT HI MDM: CPT

## 2017-09-08 PROCEDURE — 71010: CPT

## 2017-09-08 PROCEDURE — 84484 ASSAY OF TROPONIN QUANT: CPT

## 2017-09-08 PROCEDURE — 36415 COLL VENOUS BLD VENIPUNCTURE: CPT

## 2017-09-08 NOTE — CT
CT chest

 

Technique: Multiple axial sections through the chest were obtained.

 

Comparison: Previous chest x-ray of 9/8/17 and 07/20/17.

 

Findings: Confluent is density noted within the right upper lung.  

Interstitial type change extends into the right hilum.  Without 

previous chest CT difficult to exclude neoplasm given that this 

finding is slightly change from older chest x-rays.  More confluent 

area measures around 3.3 cm in size.  

 

Areas of parenchymal density are seen within both lung apices worse on

 the right side most likely due to scarring.  Severe emphysematous 

change is present.  Mild interstitial change is noted within the left 

upper lung believed to represent fibrosis.  Small nodule is noted 

within the right middle lobe measuring 4 mm.  Slight scarring is noted

 within the right middle lobe.  Interstitial fibrosis is seen within 

the right lung base extending from the right hilum.  Small 

calcification seen within the superior segment of the right lower lobe

 compatible with granuloma.  Several small mediastinal lymph nodes are

 seen which measure within normal limits.  Aorta shows atherosclerotic

 change without aneurysmal dilatation.  Mild coronary artery 

calcification is seen.  Small portion the visualized upper abdominal 

structures appear within normal limits.

 

Bone window settings were reviewed which appears within normal limits.

 

Impression:

1.  Confluent density within the right upper lung.  Difficult without 

older chest CT to determine whether this is neoplastic or due to 

confluence scarring.  Severe emphysematous change is noted with other 

scattered areas of scarring and interstitial fibrosis.

2.  Biopsy of this lesion would be difficult given the emphysematous 

change.  Consideration for PET scan could be obtained to further 

evaluate.

 

Diagnostic code #9

## 2017-09-08 NOTE — EDM.PDOC
ED HPI GENERAL MEDICAL PROBLEM





- General


Chief Complaint: Respiratory Problem


Stated Complaint: CHF


Time Seen by Provider: 09/08/17 16:09


Source of Information: Reports: Patient


History Limitations: Reports: No Limitations





- History of Present Illness


INITIAL COMMENTS - FREE TEXT/NARRATIVE: 





67-year-old female with a past medical history of COPD and emphysema presents 

with her daughter for evaluation and treatment of weight gain and lower leg 

edema. History is provided by the patient and her daughter. Current symptoms 

include weakness, lightheadedness, bilateral lower leg edema and a weight gain 

of 4 pounds in one week. Patient is chronically on oxygen due to her COPD. 

Normally wears 2-3 L at home. She has not had an increase oxygen. She has not 

noticed any shortness of breath worse than normal. She denies any chest pain, 

fevers, nausea, vomiting, worsening cough, worsening shortness of breath or any 

syncope.





Patient lives at home by herself. She frequently has fatigue due to her COPD. 

Daughter reports a week ago she fell. States that she will often fall asleep 

while standing up. She landed on her buttocks and her low back. She does have 

chronic back pain. She is currently in a pain contract for this. She has been 

walking and the daughter only recently found out about the fall.


  ** lower back


Pain Score (Numeric/FACES): 4





- Related Data


 Allergies











Allergy/AdvReac Type Severity Reaction Status Date / Time


 


Sulfa (Sulfonamide Allergy  Cannot Verified 09/10/17 15:54





Antibiotics)   Remember  


 


sunflower oil Allergy  Anaphylactic Verified 09/10/17 15:54





   Shock  


 


sunflower seed Allergy  Hives Verified 09/10/17 15:54


 


alendronate sodium AdvReac  Nausea Verified 09/10/17 15:54





[From Fosamax]     


 


duloxetine HCl AdvReac  Nausea and Verified 09/10/17 15:54





[From Cymbalta]   Vomiting  


 


erythromycin base AdvReac  Nausea Verified 09/10/17 15:54


 


pregabalin [From Lyrica] AdvReac  Pain Verified 09/10/17 15:54











Home Meds: 


 Home Meds





ARIPiprazole [Abilify] 2 mg PO DAILY 11/12/15 [History]


Furosemide [Lasix] 40 mg PO BID 11/12/15 [History]


Albuterol Sulfate [Proair Respiclick] 2 puff INH Q4H PRN 06/06/16 [History]


Escitalopram Oxalate 20 mg PO DAILY 06/16/16 [History]


Fluticasone/Salmeterol [Advair Diskus 500-50] 1 puff INH BID 06/16/16 [History]


Gabapentin [Neurontin] 600 mg PO TID 06/16/16 [History]


Aspirin/Caffeine [Yancy Back & Body Caplet] 2 tab PO Q6H PRN 01/11/17 [History]


oxyCODONE 10 mg PO Q6H PRN 01/12/17 [History]


Acetaminophen [Tylenol Arthritis] 1,500 mg PO Q6H PRN #0 01/14/17 [Rx]


Albuterol/Ipratropium [DuoNeb 3.0-0.5 MG/3 ML] 1 inh NEB Q6H PRN 01/30/17 [

History]


Diclofenac Sodium [Voltaren 1% Gel] 1 applic TOP ASDIRECTED PRN 01/30/17 [

History]


Nitroglycerin 0.4 mg .ROUTE ASDIRECTED PRN 06/19/17 [History]


Pantoprazole [ProTONIX***] 40 mg PO DAILY 06/19/17 [History]


Potassium Chloride 40 meq PO DAILY 06/19/17 [History]


Rosuvastatin [Crestor] 5 mg PO DAILY 06/19/17 [History]


Tiotropium [Spiriva HandiHaler] 18 mcg INH DAILY 06/19/17 [History]


ALPRAZolam [ALPRAZolam ODT] 0.25 mg PO BEDTIME PRN 09/08/17 [History]


Albuterol/Ipratropium [DuoNeb 3.0-0.5 MG/3 ML] 3 ml NEB Q6H PRN 09/08/17 [

History]


Calcium Carbonate/Vitamin D3 [Calcium 500 + Vit D 400] 1 each PO BID 09/08/17 [

History]


Cephalexin [IMW: Cephalexin] 500 mg PO BID #14 cap 09/08/17 [Rx]


Cholecalciferol (Vitamin D3) [Vitamin D3] 1,000 unit PO DAILY 09/08/17 [History]


Cyanocobalamin (Vitamin B-12) [Cyanocobalamin Injection] 1,000 mcg IJ 

ASDIRECTED 09/08/17 [History]


Denosumab [Prolia] 60 mg SUBCUT ONETIME 09/08/17 [History]


Ferrous Sulfate 325 mg PO DAILY 09/08/17 [History]


Loperamide [Imodium] 4 mg PO Q6H PRN 09/08/17 [History]


diphenhydrAMINE HCl [Benadryl Allergy] 25 mg PO ASDIRECTED PRN 09/08/17 [History

]


Doxycycline Hyclate 100 mg PO Q12H #20 tablet 09/10/17 [Rx]


busPIRone [Buspar] 5 mg PO TID 09/10/17 [History]











Past Medical History


HEENT History: Reports: Other (See Below)


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


Cardiovascular History: Reports: Heart Failure, MI, Other (See Below)


Other Cardiovascular History: hypotension, cardiomegaly


Respiratory History: Reports: Asthma, COPD, Pneumonia, Recurrent, Other (See 

Below)


Other Respiratory History: wears oxygen at home 2L


Gastrointestinal History: Reports: Chronic Constipation, GERD


Genitourinary History: Reports: Other (See Below)


Other Genitourinary History: stress incontinence


OB/GYN History: Reports: Pregnancy


Musculoskeletal History: Reports: Osteoarthritis, Osteoporosis


Other Musculoskeletal History: 3 herniated disc to cervical spine C2 C3  C5 C6 

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

, lumbar degneration, wrist fracture with hardware


Neurological History: Reports: None


Psychiatric History: Reports: Addiction, Anxiety, Depression


Endocrine/Metabolic History: Reports: None


Hematologic History: Reports: Anemia


Immunologic History: Reports: None


Oncologic (Cancer) History: Reports: Other (See Below)


Other Oncologic History: vocal cord ca


Dermatologic History: Reports: Cellulitis





- Infectious Disease History


Infectious Disease History: Reports: C-Difficile, Shingles





- Past Surgical History


Female  Surgical History: Reports: None


Musculoskeletal Surgical History: Reports: Other (See Below)





Social & Family History





- Family History


Family Medical History: Noncontributory


Cardiac: Reports: Heart Failure, MI


Respiratory: Reports: COPD





- Tobacco Use


Smoking Status *Q: Former Smoker


Years of Tobacco use: 35


Packs/Tins Daily: 1


Used Tobacco, but Quit: No


Month Tobacco Last Used: 12/16


Second Hand Smoke Exposure: No





- Caffeine Use


Caffeine Use: Reports: Coffee





- Recreational Drug Use


Recreational Drug Use: No





- Living Situation & Occupation


Living situation: Reports: Other


Occupation: Retired





ED ROS GENERAL





- Review of Systems


Review Of Systems: See Below


Constitutional: Reports: Weakness, Fatigue, Weight Gain (4lbs in one week).  

Denies: Fever


Respiratory: Reports: Shortness of Breath (chronic, not change).  Denies: Cough


Cardiovascular: Reports: Edema, Lightheadedness.  Denies: Chest Pain


GI/Abdominal: Denies: Nausea, Vomiting


Musculoskeletal: Reports: Back Pain (chronic)


Neurological: Denies: Syncope





ED EXAM, GENERAL





- Physical Exam


Exam: See Below


Exam Limited By: No Limitations


General Appearance: No Apparent Distress, Lethargic, Thin


Ears: Normal External Exam, Normal Canal, Hearing Grossly Normal, Normal TMs


Nose: Normal Inspection


Throat/Mouth: Normal Inspection, Normal Lips, Normal Voice, No Airway Compromise


Respiratory/Chest: No Respiratory Distress, Wheezing


Cardiovascular: Normal Peripheral Pulses, Regular Rate, Rhythm, No Murmur, 

Other (trace nonpitting edema to the bilateral legs)


Peripheral Pulses: 2+: Radial (L), Radial (R), Posterior Tibial (L), Posterior 

Tibial (R)


GI/Abdominal: Soft, Non-Tender


Back Exam: Normal Inspection


Extremities: Normal Inspection, Normal Range of Motion


Neurological: Slow to Respond


Psychiatric: Normal Affect, Normal Mood


Skin Exam: Warm, Dry, Pallor





EKG INTERPRETATION


EKG Date: 09/08/17


Time: 17:00


Rhythm: NSR


Rate (Beats/Min): 98


Axis: Normal


P-Wave: Present


QRS: LBBB


ST-T: Normal


QT: Normal


EKG Interpretation Comments: 





NSR at 98 bpm. LBBB. Reviewed by myself and Dr. olmos. 





Course





- Vital Signs


Last Recorded V/S: 


 Last Vital Signs











Temp  36.5 C   09/08/17 15:20


 


Pulse  112 H  09/08/17 20:00


 


Resp  29 H  09/08/17 20:00


 


BP  114/56 L  09/08/17 18:30


 


Pulse Ox  99   09/08/17 20:00








 





Orthostatic Blood Pressure [     106/64


Standing]                        


Orthostatic Blood Pressure [     99/57


Supine]                          











- Orders/Labs/Meds


Labs: 


 Laboratory Tests











  09/08/17 09/08/17 09/08/17 Range/Units





  16:18 16:25 16:25 


 


WBC   17.90 H   (3.98-10.04)  K/mm3


 


RBC   4.16   (3.98-5.22)  M/mm3


 


Hgb   11.9   (11.2-15.7)  gm/L


 


Hct   37.9   (34.1-44.9)  %


 


MCV   91.1   (79.4-94.8)  fl


 


MCH   28.6   (25.6-32.2)  pg


 


MCHC   31.4 L   (32.2-35.5)  g/dl


 


RDW Std Deviation   52.7 H   (36.4-46.3)  fL


 


Plt Count   350   (182-369)  K/mm3


 


MPV   9.5   (9.4-12.3)  fl


 


Neut % (Auto)   84.2 H   (34.0-71.1)  %


 


Lymph % (Auto)   7.0 L   (19.3-51.7)  %


 


Mono % (Auto)   8.1   (4.7-12.5)  %


 


Eos % (Auto)   0.3 L   (0.7-5.8)  


 


Baso % (Auto)   0.1   (0.1-1.2)  %


 


Neut # (Auto)   15.06 H   (1.56-6.13)  K/mm3


 


Lymph # (Auto)   1.26   (1.18-3.74)  K/mm3


 


Mono # (Auto)   1.45 H   (0.24-0.36)  K/mm3


 


Eos # (Auto)   0.05   (0.04-0.36)  K/mm3


 


Baso # (Auto)   0.02   (0.01-0.08)  K/mm3


 


Manual Slide Review   Normal smear   


 


Sodium    139  (136-145)  mEq/L


 


Potassium    3.3 L  (3.5-5.1)  mEq/L


 


Chloride    100  ()  mEq/L


 


Carbon Dioxide    32  (21-32)  mEq/L


 


Anion Gap    10.3  (5-15)  


 


BUN    14  (7-18)  mg/dL


 


Creatinine    0.6  (0.55-1.02)  mg/dL


 


Est Cr Clr Drug Dosing    71.96  mL/min


 


Estimated GFR (MDRD)    > 60  (>60)  mL/min


 


BUN/Creatinine Ratio    23.3 H  (14-18)  


 


Glucose    104  ()  mg/dL


 


Calcium    8.8  (8.5-10.1)  mg/dL


 


Total Bilirubin    0.3  (0.2-1.0)  mg/dL


 


AST    42 H  (15-37)  U/L


 


ALT    25  (14-59)  U/L


 


Alkaline Phosphatase    43 L  ()  U/L


 


CK-MB (CK-2)    8.0 H  (0-3.6)  ng/ml


 


Troponin I    < 0.017  (0.00-0.056)  ng/mL


 


C-Reactive Protein     (<1.0)  mg/dL


 


NT-Pro-B Natriuret Pep    157 H  (0-125)  pg/mL


 


Total Protein    7.6  (6.4-8.2)  g/dl


 


Albumin    3.9  (3.4-5.0)  g/dl


 


Globulin    3.7  gm/dL


 


Albumin/Globulin Ratio    1.1  (1-2)  


 


Urine Color  Yellow    (Yellow)  


 


Urine Appearance  Clear    (Clear)  


 


Urine pH  6.5    (5.0-8.0)  


 


Ur Specific Gravity  1.015    (1.005-1.030)  


 


Urine Protein  Negative    (Negative)  


 


Urine Glucose (UA)  Negative    (Negative)  


 


Urine Ketones  Negative    (Negative)  


 


Urine Occult Blood  Trace-intact H    (Negative)  


 


Urine Nitrite  Negative    (Negative)  


 


Urine Bilirubin  Negative    (Negative)  


 


Urine Urobilinogen  0.2    (0.2-1.0)  


 


Ur Leukocyte Esterase  Trace H    (Negative)  


 


Urine RBC  0-5    (0-5)  /hpf


 


Urine WBC  0-5    (0-5)  /hpf


 


Ur Epithelial Cells  Not seen    (0-5)  /hpf


 


Urine Bacteria  Not seen    (FEW)  /hpf


 


Urine Mucus  Not seen    (FEW)  /hpf














  09/08/17 Range/Units





  16:25 


 


WBC   (3.98-10.04)  K/mm3


 


RBC   (3.98-5.22)  M/mm3


 


Hgb   (11.2-15.7)  gm/L


 


Hct   (34.1-44.9)  %


 


MCV   (79.4-94.8)  fl


 


MCH   (25.6-32.2)  pg


 


MCHC   (32.2-35.5)  g/dl


 


RDW Std Deviation   (36.4-46.3)  fL


 


Plt Count   (182-369)  K/mm3


 


MPV   (9.4-12.3)  fl


 


Neut % (Auto)   (34.0-71.1)  %


 


Lymph % (Auto)   (19.3-51.7)  %


 


Mono % (Auto)   (4.7-12.5)  %


 


Eos % (Auto)   (0.7-5.8)  


 


Baso % (Auto)   (0.1-1.2)  %


 


Neut # (Auto)   (1.56-6.13)  K/mm3


 


Lymph # (Auto)   (1.18-3.74)  K/mm3


 


Mono # (Auto)   (0.24-0.36)  K/mm3


 


Eos # (Auto)   (0.04-0.36)  K/mm3


 


Baso # (Auto)   (0.01-0.08)  K/mm3


 


Manual Slide Review   


 


Sodium   (136-145)  mEq/L


 


Potassium   (3.5-5.1)  mEq/L


 


Chloride   ()  mEq/L


 


Carbon Dioxide   (21-32)  mEq/L


 


Anion Gap   (5-15)  


 


BUN   (7-18)  mg/dL


 


Creatinine   (0.55-1.02)  mg/dL


 


Est Cr Clr Drug Dosing   mL/min


 


Estimated GFR (MDRD)   (>60)  mL/min


 


BUN/Creatinine Ratio   (14-18)  


 


Glucose   ()  mg/dL


 


Calcium   (8.5-10.1)  mg/dL


 


Total Bilirubin   (0.2-1.0)  mg/dL


 


AST   (15-37)  U/L


 


ALT   (14-59)  U/L


 


Alkaline Phosphatase   ()  U/L


 


CK-MB (CK-2)   (0-3.6)  ng/ml


 


Troponin I   (0.00-0.056)  ng/mL


 


C-Reactive Protein  12.4 H*  (<1.0)  mg/dL


 


NT-Pro-B Natriuret Pep   (0-125)  pg/mL


 


Total Protein   (6.4-8.2)  g/dl


 


Albumin   (3.4-5.0)  g/dl


 


Globulin   gm/dL


 


Albumin/Globulin Ratio   (1-2)  


 


Urine Color   (Yellow)  


 


Urine Appearance   (Clear)  


 


Urine pH   (5.0-8.0)  


 


Ur Specific Gravity   (1.005-1.030)  


 


Urine Protein   (Negative)  


 


Urine Glucose (UA)   (Negative)  


 


Urine Ketones   (Negative)  


 


Urine Occult Blood   (Negative)  


 


Urine Nitrite   (Negative)  


 


Urine Bilirubin   (Negative)  


 


Urine Urobilinogen   (0.2-1.0)  


 


Ur Leukocyte Esterase   (Negative)  


 


Urine RBC   (0-5)  /hpf


 


Urine WBC   (0-5)  /hpf


 


Ur Epithelial Cells   (0-5)  /hpf


 


Urine Bacteria   (FEW)  /hpf


 


Urine Mucus   (FEW)  /hpf











Meds: 


Medications














Discontinued Medications














Generic Name Dose Route Start Last Admin





  Trade Name Freq  PRN Reason Stop Dose Admin


 


Albuterol/Ipratropium  3 ml  09/08/17 18:42  09/08/17 19:04





  Duoneb 3.0-0.5 Mg/3 Ml  NEB  09/08/17 18:43  3 ml





  ONETIME ONE   Administration


 


Oxycodone HCl  10 mg  09/08/17 19:49  09/08/17 20:04





  Oxycodone  PO  09/08/17 19:50  10 mg





  ONETIME ONE   Administration


 


Sodium Chloride  10 ml  09/08/17 16:07  09/08/17 16:30





  Saline Flush  FLUSH   10 ml





  ASDIRECTED PRN   Administration





  Keep Vein Open   














- Radiology Interpretation


Free Text/Narrative:: 





chest xray shows an area in the right middle lobe identified previously as a 

parenchymal density. This appears increased from previous chest x-rays. Chest 

CT obtained to further evaluate.





Chest CT without contrast impression per Dr. Carrillo 1. Confluent density within 

the right upper lung. Difficult without older chest CT to determine whether is 

neoplastic or due to confluence scarring. Severe emphysematous changes noted 

with other scattered areas of scarring and interstitial fibrosis. 2. Biopsy of 

the lesion would be difficult given emphysema is change. Consideration for PET 

scan could be obtained to further evaluate.





- Re-Assessments/Exams


Free Text/Narrative Re-Assessment/Exam: 





09/08/17 17:25


I reviewed the chest x-ray, EKG and lab results with the patient and her 

daughter.





I'm concerned about the increasing area on the chest x-ray. This is concerning 

for pneumonia. Her white count is elevated at 17.29 and her CRP is elevated at 

12.4. Of note she chronically has elevated white blood cell count and CRP. Her 

troponin is within normal limits. Her BNP is slightly elevated.





09/08/17 19:23


Offered to x-ray the low back and hips. Daughter declines this, she feels it is 

unlikely she has fractured anything.





I reviewed the CT results with the patient and her daughter. Daughter reports 

that she did have a PET scan in January. This showed no abnormalities.





Given her elevated white blood cell count and CRP I feel we should go ahead and 

treat for infection. At this point is not completely clear what is the cause. 

Daughter also reports that she did have what sounds been abscess to her nose 

recently. She lanced this at home. Patient also does have trace leukocytes in 

her urine. I will start her on Keflex as the apparent cause of her lab changes 

is likely dermatological or possibly urinary. 





As far as the edema and the weight gain, I did not appreciate much more than 

trace nonpitting edema on the legs. Her BNP is only slightly above normal. Her 

chest x-ray and chest CT so no pulmonary congestion and she is not requiring 

any more oxygen than normal. Given that she has a lower blood pressure I do not 

feel we need to increase her Lasix at this time. I think she should elevate her 

legs and use her compression stockings.





I will have her follow up closely with her primary care provider. She is 

instructed to return to the ER for symptoms change or worsen. 





Discharge instructions as documented.








Departure





- Departure


Time of Disposition: 19:49


Disposition: Home, Self-Care 01


Condition: Fair


Clinical Impression: 


 Scarring of lung, Edema





COPD (chronic obstructive pulmonary disease)


Qualifiers:


 COPD type: COPD with acute exacerbation Qualified Code(s): J44.1 - Chronic 

obstructive pulmonary disease with (acute) exacerbation








- Discharge Information


Prescriptions: 


Cephalexin [IMW: Cephalexin] 500 mg PO BID #14 cap


Instructions:  Chronic Obstructive Pulmonary Disease Exacerbation, Edema


Referrals: 


Nancy Gibbons NP [Primary Care Provider] - 


Forms:  ED Department Discharge


Additional Instructions: 


Keflex 1 tab twice a day for 7 days.





Continue with your current plan of care.





Recommend using the compression stockings. Elevate the legs.





Follow-up with your primary care provider next week for recheck of your 

symptoms.





Please return to the ER if your symptoms change or worsen.

## 2017-09-10 ENCOUNTER — HOSPITAL ENCOUNTER (EMERGENCY)
Dept: HOSPITAL 41 - JD.ED | Age: 67
Discharge: HOME | End: 2017-09-10
Payer: MEDICARE

## 2017-09-10 VITALS — SYSTOLIC BLOOD PRESSURE: 106 MMHG | DIASTOLIC BLOOD PRESSURE: 56 MMHG

## 2017-09-10 DIAGNOSIS — J44.1: ICD-10-CM

## 2017-09-10 DIAGNOSIS — Z88.8: ICD-10-CM

## 2017-09-10 DIAGNOSIS — K21.9: ICD-10-CM

## 2017-09-10 DIAGNOSIS — I25.2: ICD-10-CM

## 2017-09-10 DIAGNOSIS — F17.210: ICD-10-CM

## 2017-09-10 DIAGNOSIS — Z79.899: ICD-10-CM

## 2017-09-10 DIAGNOSIS — J18.9: Primary | ICD-10-CM

## 2017-09-10 DIAGNOSIS — F41.9: ICD-10-CM

## 2017-09-10 DIAGNOSIS — Z88.1: ICD-10-CM

## 2017-09-10 DIAGNOSIS — Z88.2: ICD-10-CM

## 2017-09-10 DIAGNOSIS — F32.9: ICD-10-CM

## 2017-09-10 DIAGNOSIS — Z79.82: ICD-10-CM

## 2017-09-10 DIAGNOSIS — M19.90: ICD-10-CM

## 2017-09-10 DIAGNOSIS — I50.9: ICD-10-CM

## 2017-09-10 PROCEDURE — 99285 EMERGENCY DEPT VISIT HI MDM: CPT

## 2017-09-10 PROCEDURE — 71275 CT ANGIOGRAPHY CHEST: CPT

## 2017-09-10 PROCEDURE — 36415 COLL VENOUS BLD VENIPUNCTURE: CPT

## 2017-09-10 PROCEDURE — 80048 BASIC METABOLIC PNL TOTAL CA: CPT

## 2017-09-10 PROCEDURE — 96361 HYDRATE IV INFUSION ADD-ON: CPT

## 2017-09-10 PROCEDURE — 96365 THER/PROPH/DIAG IV INF INIT: CPT

## 2017-09-10 NOTE — EDM.PDOC
ED HPI GENERAL MEDICAL PROBLEM





- General


Chief Complaint: Respiratory Problem


Stated Complaint: SHORT OF BREATH


Time Seen by Provider: 09/10/17 20:23





- History of Present Illness


INITIAL COMMENTS - FREE TEXT/NARRATIVE: 





67-year-old female presents emergency room with continued shortness of breath 

and chest wall pain.





Patient was seen here on Friday started on antibiotics she had an unenhanced 

CAT scan done which show either worsening scarring in her right upper lung 

versus potential tumor the patient had a PET scan done in January that showed a 

1-1/2 cm lesion they are monitoring this every 6 months by there was some 

concern that perhaps it was getting a little larger back in January. The 

unenhanced CT done on Friday shows that the mass is 3.3 cm. The patient has not 

had increase her O2 demand she's had worsening right chest wall pain brought on 

by breathing. Family is concerned about the possibility of a PE.


  ** mid back


Pain Score (Numeric/FACES): 5





- Related Data


 Allergies











Allergy/AdvReac Type Severity Reaction Status Date / Time


 


Sulfa (Sulfonamide Allergy  Cannot Verified 09/10/17 15:54





Antibiotics)   Remember  


 


sunflower oil Allergy  Anaphylactic Verified 09/10/17 15:54





   Shock  


 


sunflower seed Allergy  Hives Verified 09/10/17 15:54


 


alendronate sodium AdvReac  Nausea Verified 09/10/17 15:54





[From Fosamax]     


 


duloxetine HCl AdvReac  Nausea and Verified 09/10/17 15:54





[From Cymbalta]   Vomiting  


 


erythromycin base AdvReac  Nausea Verified 09/10/17 15:54


 


pregabalin [From Lyrica] AdvReac  Pain Verified 09/10/17 15:54











Home Meds: 


 Home Meds





ARIPiprazole [Abilify] 2 mg PO DAILY 11/12/15 [History]


Furosemide [Lasix] 40 mg PO BID 11/12/15 [History]


Albuterol Sulfate [Proair Respiclick] 2 puff INH Q4H PRN 06/06/16 [History]


Escitalopram Oxalate 20 mg PO DAILY 06/16/16 [History]


Fluticasone/Salmeterol [Advair Diskus 500-50] 1 puff INH BID 06/16/16 [History]


Gabapentin [Neurontin] 600 mg PO TID 06/16/16 [History]


Aspirin/Caffeine [Yancy Back & Body Caplet] 2 tab PO Q6H PRN 01/11/17 [History]


oxyCODONE 10 mg PO Q6H PRN 01/12/17 [History]


Acetaminophen [Tylenol Arthritis] 1,500 mg PO Q6H PRN #0 01/14/17 [Rx]


Albuterol/Ipratropium [DuoNeb 3.0-0.5 MG/3 ML] 1 inh NEB Q6H PRN 01/30/17 [

History]


Diclofenac Sodium [Voltaren 1% Gel] 1 applic TOP ASDIRECTED PRN 01/30/17 [

History]


Nitroglycerin 0.4 mg .ROUTE ASDIRECTED PRN 06/19/17 [History]


Pantoprazole [ProTONIX***] 40 mg PO DAILY 06/19/17 [History]


Potassium Chloride 40 meq PO DAILY 06/19/17 [History]


Rosuvastatin [Crestor] 5 mg PO DAILY 06/19/17 [History]


Tiotropium [Spiriva HandiHaler] 18 mcg INH DAILY 06/19/17 [History]


ALPRAZolam [ALPRAZolam ODT] 0.25 mg PO BEDTIME PRN 09/08/17 [History]


Albuterol/Ipratropium [DuoNeb 3.0-0.5 MG/3 ML] 3 ml NEB Q6H PRN 09/08/17 [

History]


Calcium Carbonate/Vitamin D3 [Calcium 500 + Vit D 400] 1 each PO BID 09/08/17 [

History]


Cephalexin [IMW: Cephalexin] 500 mg PO BID #14 cap 09/08/17 [Rx]


Cholecalciferol (Vitamin D3) [Vitamin D3] 1,000 unit PO DAILY 09/08/17 [History]


Cyanocobalamin (Vitamin B-12) [Cyanocobalamin Injection] 1,000 mcg IJ 

ASDIRECTED 09/08/17 [History]


Denosumab [Prolia] 60 mg SUBCUT ONETIME 09/08/17 [History]


Ferrous Sulfate 325 mg PO DAILY 09/08/17 [History]


Loperamide [Imodium] 4 mg PO Q6H PRN 09/08/17 [History]


diphenhydrAMINE HCl [Benadryl Allergy] 25 mg PO ASDIRECTED PRN 09/08/17 [History

]


Doxycycline Hyclate 100 mg PO Q12H #20 tablet 09/10/17 [Rx]


busPIRone [Buspar] 5 mg PO TID 09/10/17 [History]











Past Medical History


HEENT History: Reports: Other (See Below)


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


Cardiovascular History: Reports: Heart Failure, MI, Other (See Below)


Other Cardiovascular History: hypotension, cardiomegaly


Respiratory History: Reports: Asthma, COPD, Pneumonia, Recurrent, Other (See 

Below)


Other Respiratory History: wears oxygen at home 2L


Gastrointestinal History: Reports: Chronic Constipation, GERD


Genitourinary History: Reports: Other (See Below)


Other Genitourinary History: stress incontinence


OB/GYN History: Reports: Pregnancy


Musculoskeletal History: Reports: Osteoarthritis, Osteoporosis


Other Musculoskeletal History: 3 herniated disc to cervical spine C2 C3  C5 C6 

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

, lumbar degneration, wrist fracture with hardware


Neurological History: Reports: None


Psychiatric History: Reports: Addiction, Anxiety, Depression


Endocrine/Metabolic History: Reports: None


Hematologic History: Reports: Anemia


Immunologic History: Reports: None


Oncologic (Cancer) History: Reports: Other (See Below)


Other Oncologic History: vocal cord ca


Dermatologic History: Reports: Cellulitis





- Infectious Disease History


Infectious Disease History: Reports: C-Difficile, Shingles





- Past Surgical History


Female  Surgical History: Reports: None


Musculoskeletal Surgical History: Reports: Other (See Below)





Social & Family History





- Family History


Family Medical History: Noncontributory


Cardiac: Reports: Heart Failure, MI


Respiratory: Reports: COPD





- Tobacco Use


Smoking Status *Q: Current Some Day Smoker


Years of Tobacco use: 35


Packs/Tins Daily: 0.2


Used Tobacco, but Quit: No


Month Tobacco Last Used: 12/16


Second Hand Smoke Exposure: No





- Caffeine Use


Caffeine Use: Reports: Coffee





- Recreational Drug Use


Recreational Drug Use: No





- Living Situation & Occupation


Living situation: Reports: Other


Occupation: Retired





ED ROS GENERAL





- Review of Systems


Review Of Systems: See Below


Constitutional: Reports: Weakness.  Denies: Fever, Chills


HEENT: Reports: No Symptoms


Respiratory: Reports: Shortness of Breath, Pleuritic Chest Pain, Cough (Chronic)

, Sputum (Chronic).  Denies: Hemoptysis


Cardiovascular: Reports: Edema (This comes and goes).  Denies: Chest Pain


GI/Abdominal: Reports: No Symptoms.  Denies: Abdominal Pain


: Reports: No Symptoms


Skin: Reports: No Symptoms


Neurological: Reports: No Symptoms


Psychiatric: Reports: No Symptoms





ED EXAM, GENERAL





- Physical Exam


Exam: See Below


Exam Limited By: No Limitations


General Appearance: Alert, No Apparent Distress, Other (Vital signs stable she 

is somewhat tachycardic however pulse in the low 100s)


Ears: Normal External Exam, Normal Canal, Normal TMs


Nose: Normal Inspection


Throat/Mouth: Normal Inspection, Normal Lips, Normal Oropharynx, No Airway 

Compromise


Head: Atraumatic, Normocephalic


Neck: Normal Inspection, Supple, Non-Tender, Full Range of Motion.  No: 

Lymphadenopathy (L), Lymphadenopathy (R)


Respiratory/Chest: Lungs Clear, Other (She has some right-sided chest wall 

discomfort with breathing not necessarily aggravated with palpation).  No: Rales

, Rhonchi, Wheezing


Cardiovascular: Regular Rate, Rhythm, No Edema, No Murmur


GI/Abdominal: Normal Bowel Sounds, Soft, Non-Tender


Extremities: Normal Inspection, No Pedal Edema


Neurological: Alert, Oriented, Normal Cognition





Course





- Vital Signs


Last Recorded V/S: 


 Last Vital Signs











Temp  36.7 C   09/10/17 15:49


 


Pulse  106 H  09/10/17 15:49


 


Resp  20   09/10/17 15:49


 


BP  110/55 L  09/10/17 15:49


 


Pulse Ox  99   09/10/17 15:49














- Orders/Labs/Meds


Orders: 


 Active Orders 24 hr











 Category Date Time Status


 


 Ang Chest [CT] Stat Exams  09/10/17 17:45 Taken


 


 Sodium Chloride 0.9% [Normal Saline] 100 ml Med  09/10/17 19:30 Active





 IV ASDIRECTED   


 


 cefTRIAXone [Rocephin] 1 gm Med  09/10/17 20:37 Ordered





 Sodium Chloride 0.9% [Normal Saline] 100 ml   





 IV ONETIME   








 Medication Orders





Sodium Chloride (Normal Saline)  100 mls @ 60 mls/hr IV ASDIRECTED [facility]


   Last Admin: 09/10/17 19:52  Dose: 60 mls/hr


Ceftriaxone Sodium 1 gm/ (Sodium Chloride)  100 mls @ 200 mls/hr IV ONETIME ONE


   Stop: 09/10/17 21:06








Labs: 


 Laboratory Tests











  09/10/17 Range/Units





  17:56 


 


Sodium  134 L  (136-145)  mEq/L


 


Potassium  3.6  (3.5-5.1)  mEq/L


 


Chloride  95 L  ()  mEq/L


 


Carbon Dioxide  33 H  (21-32)  mEq/L


 


Anion Gap  9.6  (5-15)  


 


BUN  15  (7-18)  mg/dL


 


Creatinine  0.7  (0.55-1.02)  mg/dL


 


Est Cr Clr Drug Dosing  TNP  


 


Estimated GFR (MDRD)  > 60  (>60)  mL/min


 


BUN/Creatinine Ratio  21.4 H  (14-18)  


 


Glucose  118 H  ()  mg/dL


 


Calcium  8.1 L  (8.5-10.1)  mg/dL











Meds: 


Medications











Generic Name Dose Route Start Last Admin





  Trade Name Freq  PRN Reason Stop Dose Admin


 


Sodium Chloride  100 mls @ 60 mls/hr  09/10/17 19:30  09/10/17 19:52





  Normal Saline  IV   60 mls/hr





  ASDIRECTED GUICHO   Administration


 


Ceftriaxone Sodium 1 gm/  100 mls @ 200 mls/hr  09/10/17 20:37  





  Sodium Chloride  IV  09/10/17 21:06  





  ONETIME ONE   














Discontinued Medications














Generic Name Dose Route Start Last Admin





  Trade Name Freq  PRN Reason Stop Dose Admin


 


Lactated Ringer's  500 mls @ 999 mls/hr  09/10/17 17:43  09/10/17 17:58





  Ringers, Lactated  IV  09/10/17 18:13  999 mls/hr





  .BOLUS ONE   Administration


 


Iopamidol  100 ml  09/10/17 19:27  09/10/17 19:52





  Isovue-370 (76%)  IVPUSH  09/10/17 19:28  100 ml





  ONETIME ONE   Administration


 


Sodium Chloride  10 ml  09/10/17 19:27  09/10/17 19:52





  Saline Flush  FLUSH  09/10/17 19:28  10 ml





  ONETIME ONE   Administration














- Re-Assessments/Exams


Free Text/Narrative Re-Assessment/Exam: 





09/10/17 20:30


Patient was given a fluid bolus and we ordered a basic metabolic panel 

anticipating getting a CTA chest d-dimer was deferred as using Wells criteria 

she was already moderate probability. CT shows no evidence of pulmonary 

embolism she does have right upper lung posterior pneumonia however. The 

patient will receive a gram of Rocephin tonight and tomorrow started on 

doxycycline the patient is already taken cephalexin she will continue this.





Departure





- Departure


Time of Disposition: 20:38


Disposition: Home, Self-Care 01


Clinical Impression: 


Pneumonia


Qualifiers:


 Pneumonia type: due to unspecified organism Laterality: unspecified laterality 

Lung location: unspecified part of lung Qualified Code(s): J18.9 - Pneumonia, 

unspecified organism





COPD (chronic obstructive pulmonary disease)


Qualifiers:


 COPD type: COPD with acute exacerbation Qualified Code(s): J44.1 - Chronic 

obstructive pulmonary disease with (acute) exacerbation





Clinical Impression: 


 (Ruled Out): Acute exacerbation of chronic obstructive pulmonary disease (COPD)





- Discharge Information


Prescriptions: 


Doxycycline Hyclate 100 mg PO Q12H #20 tablet


Referrals: 


Nancy Gibbons, NP [Primary Care Provider] - 


Forms:  ED Department Discharge


Additional Instructions: 


Return to the emergency room with any questions problems worsening symptoms.





Evaluation today is consistent with a pneumonia. She is to continue taking her 

cephalexin she is started on doxycycline 100 mg twice a day. While she is 

taking the doxycycline hold her iron supplements. Have her take her potassium 

in between her doxycycline doses. This evening she will receive a gram of 

Rocephin IV.





As we discussed she should have an additional dose of potassium tonight.





She needs to follow-up for her PET scan this next month. However they need to 

be aware of the pneumonia.





- My Orders


Last 24 Hours: 


My Active Orders





09/10/17 17:45


Ang Chest [CT] Stat 





09/10/17 19:30


Sodium Chloride 0.9% [Normal Saline] 100 ml IV ASDIRECTED 





09/10/17 20:37


cefTRIAXone [Rocephin] 1 gm   Sodium Chloride 0.9% [Normal Saline] 100 ml IV 

ONETIME 














- Assessment/Plan


Last 24 Hours: 


My Active Orders





09/10/17 17:45


Ang Chest [CT] Stat 





09/10/17 19:30


Sodium Chloride 0.9% [Normal Saline] 100 ml IV ASDIRECTED 





09/10/17 20:37


cefTRIAXone [Rocephin] 1 gm   Sodium Chloride 0.9% [Normal Saline] 100 ml IV 

ONETIME

## 2017-09-11 NOTE — CR
Chest: Portable view of the chest was obtained.

 

Comparison: Previous chest x-ray of 07/28/17.

 

Increasing parenchymal density is noted within the right upper lung.  

Differential includes neoplastic mass versus pneumonia or even a 

combination of both.  Lungs otherwise are clear.  Heart size and 

mediastinum are normal.  Bony structures are grossly intact.

 

Impression:

1.  Parenchymal density within the right upper chest increased in 

prominence from prior exam.

 

Diagnostic code #9

## 2017-09-11 NOTE — CT
CT chest

 

Technique: Multiple axial sections through the chest were obtained.  

Intravenous contrast was utilized.  Study has been performed as a 

pulmonary angiogram protocol.

 

Comparison: Previous chest CT of 09/08/17 is available.

 

Findings: No filling defects are seen to indicate pulmonary embolism. 

 Small mediastinal lymph nodes are seen which measure within normal 

limits.  Thoracic aorta shows no aneurysm.

 

Increasing parenchymal density is noted within the right chest from 

prior exam.  Given the rapid worsening this is most likely due to 

combination of atelectasis and pneumonia.  Additional parenchymal 

density seen more inferiorly within the right lung base which is also 

more prominent and presumably due to additional pneumonia.  Mild 

interstitial change is seen within the right upper and right lower 

lung likely due to additional infection.  Scarring is noted within the

 left upper lung.  Diffuse emphysematous changes are present.

 

Impression:

1.  Increasing density within the right lung from prior chest CT.  

Given the rapid worsening, findings most likely are infectious in 

etiology.

2.  No findings of pulmonary embolism.

 

Diagnostic code #3

 

Agree with preliminary report issued by [facility] ([facility] 

preliminary report dictated on 09/10/17, 9:28 PM Central Time)

## 2017-10-04 ENCOUNTER — HOSPITAL ENCOUNTER (EMERGENCY)
Dept: HOSPITAL 41 - JD.ED | Age: 67
Discharge: HOME | End: 2017-10-04
Payer: MEDICARE

## 2017-10-04 VITALS — SYSTOLIC BLOOD PRESSURE: 98 MMHG | DIASTOLIC BLOOD PRESSURE: 44 MMHG

## 2017-10-04 DIAGNOSIS — Z88.8: ICD-10-CM

## 2017-10-04 DIAGNOSIS — F11.20: Primary | ICD-10-CM

## 2017-10-04 DIAGNOSIS — Z90.49: ICD-10-CM

## 2017-10-04 DIAGNOSIS — Z85.21: ICD-10-CM

## 2017-10-04 DIAGNOSIS — J44.9: ICD-10-CM

## 2017-10-04 DIAGNOSIS — Z99.81: ICD-10-CM

## 2017-10-04 DIAGNOSIS — K21.9: ICD-10-CM

## 2017-10-04 DIAGNOSIS — Z87.891: ICD-10-CM

## 2017-10-04 DIAGNOSIS — Z88.1: ICD-10-CM

## 2017-10-04 DIAGNOSIS — Z79.82: ICD-10-CM

## 2017-10-04 DIAGNOSIS — Z79.899: ICD-10-CM

## 2017-10-04 DIAGNOSIS — Z86.2: ICD-10-CM

## 2017-10-04 DIAGNOSIS — I50.9: ICD-10-CM

## 2017-10-04 DIAGNOSIS — Z91.018: ICD-10-CM

## 2017-10-04 DIAGNOSIS — Z98.890: ICD-10-CM

## 2017-10-04 DIAGNOSIS — Z88.2: ICD-10-CM

## 2017-10-04 DIAGNOSIS — I25.2: ICD-10-CM

## 2017-10-04 DIAGNOSIS — M19.90: ICD-10-CM

## 2017-10-04 DIAGNOSIS — M81.0: ICD-10-CM

## 2017-10-04 DIAGNOSIS — F32.9: ICD-10-CM

## 2017-10-04 PROCEDURE — 99284 EMERGENCY DEPT VISIT MOD MDM: CPT

## 2017-10-04 NOTE — EDM.PDOC
ED HPI GENERAL MEDICAL PROBLEM





- General


Chief Complaint: Drug or Alcohol Abuse


Stated Complaint: [facility]


Time Seen by Provider: 10/04/17 18:35


Source of Information: Reports: Patient


History Limitations: Reports: No Limitations





- History of Present Illness


INITIAL COMMENTS - FREE TEXT/NARRATIVE: 





67-year-old female arrives via [facility] for opiate 

withdrawal symptoms. History is provided by the patient. Patient reports about 

one week ago she noticed that her oxycodone tablets were less then she needed 

it. She states that about a week ago she appreciated that she had 11 tablets 

which is not enough to get her through until her next refill. She normally 

takes oxycodone 10 mg every 6 hours for chronic back pain. She is in a pain 

contract with the pain clinic in [facility]. Last refill of oxycodone was filled 

on 9-18-17. This was a 28 day supply (112 tabs). At some point last week her 

daughter came over. She states that she then only had had 7 tablets left. 

Patient reports she now took her last tablet of oxycodone last night. She has 

been now been without oxycodone for approximately 18 hours. She is reports 

associated symptoms of severe spinal pain and shakes. She denies any fevers, 

nausea, vomiting or abdominal pain. She states that her stools have been looser 

than normal. 





Reports that she contacted her pain clinic today. Reports that she left a 

message with them that her tablets have been stolen. She did not receive a 

phone call back from them. She states she did file a police report today.


  ** Lower Back


Pain Score (Numeric/FACES): 7





- Related Data


 Allergies











Allergy/AdvReac Type Severity Reaction Status Date / Time


 


Sulfa (Sulfonamide Allergy  Cannot Verified 10/04/17 18:04





Antibiotics)   Remember  


 


sunflower oil Allergy  Anaphylactic Verified 10/04/17 18:04





   Shock  


 


sunflower seed Allergy  Hives Verified 10/04/17 18:04


 


alendronate sodium AdvReac  Nausea Verified 10/04/17 18:04





[From Fosamax]     


 


duloxetine HCl AdvReac  Nausea and Verified 10/04/17 18:04





[From Cymbalta]   Vomiting  


 


erythromycin base AdvReac  Nausea Verified 10/04/17 18:04


 


pregabalin [From Lyrica] AdvReac  Pain Verified 10/04/17 18:04











Home Meds: 


 Home Meds





ARIPiprazole [Abilify] 2 mg PO DAILY 11/12/15 [History]


Furosemide [Lasix] 40 mg PO BID 11/12/15 [History]


Albuterol Sulfate [Proair Respiclick] 2 puff INH Q4H PRN 06/06/16 [History]


Escitalopram Oxalate 20 mg PO DAILY 06/16/16 [History]


Fluticasone/Salmeterol [Advair Diskus 500-50] 1 puff INH BID 06/16/16 [History]


Gabapentin [Neurontin] 600 mg PO TID 06/16/16 [History]


Aspirin/Caffeine [Yancy Back & Body Caplet] 2 tab PO Q6H PRN 01/11/17 [History]


oxyCODONE 10 mg PO Q6H PRN 01/12/17 [History]


Acetaminophen [Tylenol Arthritis] 1,500 mg PO Q6H PRN #0 01/14/17 [Rx]


Albuterol/Ipratropium [DuoNeb 3.0-0.5 MG/3 ML] 1 inh NEB Q6H PRN 01/30/17 [

History]


Diclofenac Sodium [Voltaren 1% Gel] 1 applic TOP ASDIRECTED PRN 01/30/17 [

History]


Nitroglycerin 0.4 mg .ROUTE ASDIRECTED PRN 06/19/17 [History]


Pantoprazole [ProTONIX***] 40 mg PO DAILY 06/19/17 [History]


Potassium Chloride 40 meq PO DAILY 06/19/17 [History]


Rosuvastatin [Crestor] 5 mg PO DAILY 06/19/17 [History]


Tiotropium [Spiriva HandiHaler] 18 mcg INH DAILY 06/19/17 [History]


ALPRAZolam [ALPRAZolam ODT] 0.25 mg PO BEDTIME PRN 09/08/17 [History]


Albuterol/Ipratropium [DuoNeb 3.0-0.5 MG/3 ML] 3 ml NEB Q6H PRN 09/08/17 [

History]


Calcium Carbonate/Vitamin D3 [Calcium 500 + Vit D 400] 1 each PO BID 09/08/17 [

History]


Cholecalciferol (Vitamin D3) [Vitamin D3] 1,000 unit PO DAILY 09/08/17 [History]


Cyanocobalamin (Vitamin B-12) [Cyanocobalamin Injection] 1,000 mcg IJ 

ASDIRECTED 09/08/17 [History]


Denosumab [Prolia] 60 mg SUBCUT ONETIME 09/08/17 [History]


Loperamide [Imodium] 4 mg PO Q6H PRN 09/08/17 [History]


diphenhydrAMINE HCl [Benadryl Allergy] 25 mg PO ASDIRECTED PRN 09/08/17 [History

]


busPIRone [Buspar] 10 mg PO TID 09/10/17 [History]


oxyCODONE 10 mg PO Q6H #5 tablet 10/04/17 [Rx]











Past Medical History


HEENT History: Reports: Other (See Below)


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


Cardiovascular History: Reports: Heart Failure, MI, Other (See Below)


Other Cardiovascular History: hypotension, cardiomegaly


Respiratory History: Reports: Asthma, COPD, Pneumonia, Recurrent, Other (See 

Below)


Other Respiratory History: wears oxygen at home 3L/nc


Gastrointestinal History: Reports: Chronic Constipation, GERD


Genitourinary History: Reports: Other (See Below)


Other Genitourinary History: stress incontinence


OB/GYN History: Reports: Pregnancy


Musculoskeletal History: Reports: Osteoarthritis, Osteoporosis


Other Musculoskeletal History: 3 herniated disc to cervical spine C2 C3  C5 C6 

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

, lumbar degneration, wrist fracture with hardware


Neurological History: Reports: None


Psychiatric History: Reports: Addiction, Anxiety, Depression


Endocrine/Metabolic History: Reports: None


Hematologic History: Reports: Anemia


Immunologic History: Reports: None


Oncologic (Cancer) History: Reports: Other (See Below)


Other Oncologic History: vocal cord ca


Dermatologic History: Reports: Cellulitis





- Infectious Disease History


Infectious Disease History: Reports: C-Difficile, Shingles





- Past Surgical History


GI Surgical History: Reports: Appendectomy, Cholecystectomy


Female  Surgical History: Reports: None


Neurological Surgical History: Reports: C-Spine, Discectomy, Lumbar Spine


Musculoskeletal Surgical History: Reports: Other (See Below)


Other Musculoskeletal Surgeries/Procedures:: Rodding of the right thigh





Social & Family History





- Family History


Family Medical History: Noncontributory


Cardiac: Reports: Heart Failure, MI


Respiratory: Reports: COPD





- Tobacco Use


Smoking Status *Q: Former Smoker


Years of Tobacco use: 35


Packs/Tins Daily: 0.2


Used Tobacco, but Quit: Yes


Month Tobacco Last Used: oct


Second Hand Smoke Exposure: No





- Caffeine Use


Caffeine Use: Reports: Coffee, Tea





- Recreational Drug Use


Recreational Drug Use: No





- Living Situation & Occupation


Living situation: Reports: Other


Occupation: Retired





ED ROS GENERAL





- Review of Systems


Review Of Systems: See Below


Constitutional: Denies: Fever


GI/Abdominal: Denies: Abdominal Pain, Diarrhea, Nausea, Vomiting


Musculoskeletal: Reports: Back Pain


Neurological: Reports: Tremors





ED EXAM, GENERAL





- Physical Exam


Exam: See Below


Exam Limited By: No Limitations


General Appearance: Alert, WD/WN, No Apparent Distress


Respiratory/Chest: No Respiratory Distress, Decreased Breath Sounds


Cardiovascular: Normal Peripheral Pulses, Regular Rate, Rhythm, No Murmur


Neurological: Alert, Oriented, Normal Cognition


Psychiatric: Normal Affect, Normal Mood


Skin Exam: Warm, Dry, Normal Color





Course





- Vital Signs


Last Recorded V/S: 


 Last Vital Signs











Temp  37.0 C   10/04/17 18:15


 


Pulse  91   10/04/17 18:15


 


Resp  13   10/04/17 18:15


 


BP  98/44 L  10/04/17 18:15


 


Pulse Ox  97   10/04/17 18:15














- Orders/Labs/Meds


Meds: 


Medications














Discontinued Medications














Generic Name Dose Route Start Last Admin





  Trade Name Mesfin  PRN Reason Stop Dose Admin


 


Oxycodone HCl  10 mg  10/04/17 18:39  10/04/17 19:14





  Oxycodone  PO  10/04/17 18:40  10 mg





  ONETIME ONE   Administration














- Re-Assessments/Exams


Free Text/Narrative Re-Assessment/Exam: 





10/04/17 19:00


The patient was searched on the [place] prescription drug registry. 





I will give her 1 10mg oxycodone here and write her for 5 pills. She was 

informed it is our policy in the ER that we do not prescribe opioids for 

chronic pain in the ER especially given she is already in a pain contract. She 

was warned she is at risk for losing her pain contract with [facility] given she 

is here for her chronic back pain and this is not an acute problem.  She was 

encouraged to recheck her home to ensure she did not lose the pills. She was 

warned she will likely not receive refills in the future from the ER. 





Departure





- Departure


Time of Disposition: 19:00


Disposition: Home, Self-Care 01


Condition: Fair


Clinical Impression: 


 Drug dependence








- Discharge Information


Prescriptions: 


oxyCODONE 10 mg PO Q6H #5 tablet


Referrals: 


Nancy Gibbons NP [Primary Care Provider] - 


Joann Velasco NP [Ordering Only Provider] - 


Additional Instructions: 


Continue with your current plan of care. 





Unfortunately, we are unable to refill narcotic pain medications for chronic 

problems in the ER. Contact your pain clinic or your PCP for further refills. 





I encourage to recheck your home to ensure you did not misplace the pain pills. 





Please return to the ER should your symptoms change or worsen.

## 2017-10-06 ENCOUNTER — HOSPITAL ENCOUNTER (EMERGENCY)
Dept: HOSPITAL 41 - JD.ED | Age: 67
Discharge: HOME | End: 2017-10-06
Payer: MEDICARE

## 2017-10-06 VITALS — DIASTOLIC BLOOD PRESSURE: 67 MMHG | SYSTOLIC BLOOD PRESSURE: 106 MMHG

## 2017-10-06 DIAGNOSIS — Z87.891: ICD-10-CM

## 2017-10-06 DIAGNOSIS — Z79.899: ICD-10-CM

## 2017-10-06 DIAGNOSIS — M54.9: Primary | ICD-10-CM

## 2017-10-06 DIAGNOSIS — Z88.2: ICD-10-CM

## 2017-10-06 DIAGNOSIS — Z88.1: ICD-10-CM

## 2017-10-06 DIAGNOSIS — F11.20: ICD-10-CM

## 2017-10-06 DIAGNOSIS — K21.9: ICD-10-CM

## 2017-10-06 DIAGNOSIS — J45.909: ICD-10-CM

## 2017-10-06 DIAGNOSIS — G89.29: ICD-10-CM

## 2017-10-06 DIAGNOSIS — Z87.01: ICD-10-CM

## 2017-10-06 PROCEDURE — 99284 EMERGENCY DEPT VISIT MOD MDM: CPT
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
## 2018-03-07 ENCOUNTER — HOSPITAL ENCOUNTER (EMERGENCY)
Dept: HOSPITAL 41 - JD.ED | Age: 68
Discharge: HOME | End: 2018-03-07
Payer: MEDICARE

## 2018-03-07 VITALS — DIASTOLIC BLOOD PRESSURE: 55 MMHG | SYSTOLIC BLOOD PRESSURE: 104 MMHG

## 2018-03-07 DIAGNOSIS — F32.9: ICD-10-CM

## 2018-03-07 DIAGNOSIS — A04.72: Primary | ICD-10-CM

## 2018-03-07 DIAGNOSIS — J44.9: ICD-10-CM

## 2018-03-07 DIAGNOSIS — Z88.1: ICD-10-CM

## 2018-03-07 DIAGNOSIS — Z79.899: ICD-10-CM

## 2018-03-07 DIAGNOSIS — R07.9: ICD-10-CM

## 2018-03-07 DIAGNOSIS — I50.9: ICD-10-CM

## 2018-03-07 DIAGNOSIS — Z91.09: ICD-10-CM

## 2018-03-07 DIAGNOSIS — Z88.2: ICD-10-CM

## 2018-03-07 DIAGNOSIS — Z85.828: ICD-10-CM

## 2018-03-07 DIAGNOSIS — Z88.8: ICD-10-CM

## 2018-03-07 DIAGNOSIS — K21.9: ICD-10-CM

## 2018-03-07 DIAGNOSIS — F41.9: ICD-10-CM

## 2018-03-07 DIAGNOSIS — I11.0: ICD-10-CM

## 2018-03-07 PROCEDURE — 96375 TX/PRO/DX INJ NEW DRUG ADDON: CPT

## 2018-03-07 PROCEDURE — 51702 INSERT TEMP BLADDER CATH: CPT

## 2018-03-07 PROCEDURE — 87493 C DIFF AMPLIFIED PROBE: CPT

## 2018-03-07 PROCEDURE — 81001 URINALYSIS AUTO W/SCOPE: CPT

## 2018-03-07 PROCEDURE — 96374 THER/PROPH/DIAG INJ IV PUSH: CPT

## 2018-03-07 PROCEDURE — 80053 COMPREHEN METABOLIC PANEL: CPT

## 2018-03-07 PROCEDURE — 83735 ASSAY OF MAGNESIUM: CPT

## 2018-03-07 PROCEDURE — 99285 EMERGENCY DEPT VISIT HI MDM: CPT

## 2018-03-07 PROCEDURE — 85025 COMPLETE CBC W/AUTO DIFF WBC: CPT

## 2018-03-07 PROCEDURE — 36415 COLL VENOUS BLD VENIPUNCTURE: CPT

## 2018-03-07 PROCEDURE — 93005 ELECTROCARDIOGRAM TRACING: CPT

## 2018-03-07 NOTE — EDM.PDOC
ED HPI GENERAL MEDICAL PROBLEM





- General


Chief Complaint: Gastrointestinal Problem


Stated Complaint: [facility]


Time Seen by Provider: 03/07/18 10:15


Source of Information: Reports: Patient


History Limitations: Reports: No Limitations





- History of Present Illness


INITIAL COMMENTS - FREE TEXT/NARRATIVE: 





The patient states that she has was on oral clindamycin in mid-February for 

cellulitis of her left leg. She then developed watery diarrhea and abdominal 

cramps on Friday, 3/2/2018, that has not responded to maximum doses of Imodium. 

She developed nausea and dry heaves 2 days ago, then emesis last night. She 

states that she had blood in her diarrhea yesterday. She also reports having 

some chest pain 2 nights ago secondary to the stress of her diarrhea. She has 

not had a fever.





She does not recall eating any bad or spoiled food prior to the development of 

her diarrhea. No recent travel. The patient's daughter had slight diarrhea 2 

days ago.





The patient[facility]. She states that no outpatient 

tests have been done.








  ** Lower Back


Pain Score (Numeric/FACES): 6





- Related Data


 Allergies











Allergy/AdvReac Type Severity Reaction Status Date / Time


 


clindamycin Allergy  Hives Verified 03/07/18 09:50


 


Sulfa (Sulfonamide Allergy  Cannot Verified 03/07/18 09:50





Antibiotics)   Remember  


 


sunflower oil Allergy  Anaphylactic Verified 03/07/18 09:50





   Shock  


 


sunflower seed Allergy  Hives Verified 03/07/18 09:50


 


alendronate sodium AdvReac  Nausea Verified 03/07/18 09:50





[From Fosamax]     


 


duloxetine HCl AdvReac  Nausea and Verified 03/07/18 09:50





[From Cymbalta]   Vomiting  


 


erythromycin base AdvReac  Nausea Verified 03/07/18 09:50


 


pregabalin [From Lyrica] AdvReac  Pain Verified 03/07/18 09:50











Home Meds: 


 Home Meds





ARIPiprazole [Abilify] 2 mg PO DAILY 11/12/15 [History]


Furosemide [Lasix] 40 mg PO BID 11/12/15 [History]


Albuterol Sulfate [Proair Respiclick] 2 puff INH Q4H PRN 06/06/16 [History]


Escitalopram Oxalate 20 mg PO DAILY 06/16/16 [History]


Fluticasone/Salmeterol [Advair Diskus 500-50] 1 puff INH BID 06/16/16 [History]


Gabapentin [Neurontin] 600 mg PO BID 06/16/16 [History]


Aspirin/Caffeine [Yancy Back & Body Caplet] 2 tab PO Q6H PRN 01/11/17 [History]


Acetaminophen [Tylenol Arthritis] 1,500 mg PO Q6H PRN #0 01/14/17 [Rx]


Diclofenac Sodium [Voltaren 1% Gel] 1 applic TOP ASDIRECTED PRN 01/30/17 [

History]


Nitroglycerin 0.4 mg .ROUTE ASDIRECTED PRN 06/19/17 [History]


Pantoprazole [ProTONIX***] 40 mg PO DAILY 06/19/17 [History]


Potassium Chloride 40 meq PO DAILY 06/19/17 [History]


Rosuvastatin [Crestor] 5 mg PO DAILY 06/19/17 [History]


Tiotropium [Spiriva HandiHaler] 18 mcg INH DAILY 06/19/17 [History]


Albuterol/Ipratropium [DuoNeb 3.0-0.5 MG/3 ML] 3 ml NEB Q6H PRN 09/08/17 [

History]


Calcium Carbonate/Vitamin D3 [Calcium 500 + Vit D 400] 1 each PO BID 09/08/17 [

History]


Cholecalciferol (Vitamin D3) [Vitamin D3] 1,000 unit PO DAILY 09/08/17 [History]


Cyanocobalamin (Vitamin B-12) [Cyanocobalamin Injection] 1,000 mcg IJ 

ASDIRECTED 09/08/17 [History]


Denosumab [Prolia] 60 mg SUBCUT ONETIME 09/08/17 [History]


Loperamide [Imodium] 4 mg PO Q6H PRN 09/08/17 [History]


diphenhydrAMINE HCl [Benadryl Allergy] 25 mg PO ASDIRECTED PRN 09/08/17 [History

]


busPIRone [Buspar] 10 mg PO BID 09/10/17 [History]


oxyCODONE 10 mg PO Q6H #5 tablet 10/04/17 [Rx]


Carvedilol [Coreg] 3.125 mg PO DAILY 10/06/17 [History]


metroNIDAZOLE [Flagyl] 1 tab PO Q8H #29 tab 03/07/18 [Rx]











Past Medical History


Cardiovascular History: Reports: Heart Failure, High Cholesterol, Hypertension


Respiratory History: Reports: COPD (home O2 3L continulusly)


Gastrointestinal History: Reports: GERD


Genitourinary History: Reports: Urinary Incontinence (stress incontinence)


OB/GYN History: Reports: Pregnancy


Musculoskeletal History: Reports: Back Pain, Chronic (due to spinal stenosis), 

Osteoarthritis, Osteoporosis


Psychiatric History: Reports: Addiction, Anxiety, Depression


Hematologic History: Reports: Anemia


Oncologic (Cancer) History: Reports: Squamous Cell Carcinoma (of vocal cords - 

RTx 2008)





- Infectious Disease History


Infectious Disease History: Reports: C-Difficile, Shingles





- Past Surgical History


HEENT Surgical History: Reports: Adenoidectomy, Other (See Below) (Squamous 

cell CA excised from vocal cords)


GI Surgical History: Reports: Appendectomy, Cholecystectomy


Neurological Surgical History: Reports: C-Spine, Discectomy, Lumbar Spine


Musculoskeletal Surgical History: Reports: Hip Replacement (right), ORIF (left 

wrist), Other (See Below) (Right thigh denny)





Social & Family History





- Family History


Family Medical History: Noncontributory


Cardiac: Reports: Heart Failure, MI


Respiratory: Reports: COPD





- Tobacco Use


Smoking Status *Q: Current Every Day Smoker


Years of Tobacco use: 47


Packs/Tins Daily: 1





- Caffeine Use


Caffeine Use: Reports: None





- Alcohol Use


Alcohol Use History: No





- Recreational Drug Use


Recreational Drug Use: Yes


Drug Use in Last 12 Months: Yes


Recreational Drug Type: Reports: Oxycodone, Xanax


Recreational Drug Use Frequency: Daily





- Living Situation & Occupation


Living situation: Reports: , Alone


Occupation: Retired





ED ROS GENERAL





- Review of Systems


Review Of Systems: ROS reveals no pertinent complaints other than HPI.





ED EXAM, GI/ABD





- Physical Exam


Exam: See Below


Exam Limited By: No Limitations


General Appearance: Alert, WD/WN, No Apparent Distress


Eyes: Bilateral: Normal Appearance, EOMI


Ears: Normal External Exam, Hearing Grossly Normal


Nose: Normal Inspection, No Blood


Throat/Mouth: Normal Inspection, Normal Lips, Normal Voice, No Airway Compromise


Head: Atraumatic, Normocephalic


Neck: Normal Inspection, Full Range of Motion


Respiratory/Chest: No Respiratory Distress, Lungs Clear, Normal Breath Sounds, 

No Accessory Muscle Use


Cardiovascular: Normal Peripheral Pulses, Regular Rate, Rhythm, No Gallop, No 

JVD, No Murmur, No Rub


GI/Abdominal Exam: Normal Bowel Sounds, Soft, Non-Tender, No Organomegaly, No 

Distention, No Abnormal Bruit, No Mass


 (Female) Exam: Deferred


Rectal (Female) Exam: Deferred


Back Exam: Normal Inspection, Full Range of Motion, NT


Extremities: Normal Inspection, Normal Range of Motion, No Pedal Edema, Normal 

Capillary Refill


Neurological: Alert, Oriented, Normal Cognition, No Motor/Sensory Deficits


Psychiatric: Normal Affect


Skin Exam: Warm, Dry, Intact, Normal Color, No Rash





EKG INTERPRETATION


EKG Date: 03/07/18


Time: 10:55


Rhythm: NSR


Rate (Beats/Min): 77


Axis: Normal


P-Wave: Present


QRS: RBBB (incomplete)


ST-T: Normal


QT: Normal (+ LVH)





Course





- Vital Signs


Last Recorded V/S: 


 Last Vital Signs











Temp  36.4 C   03/07/18 09:45


 


Pulse  82   03/07/18 12:42


 


Resp  18   03/07/18 12:42


 


BP  104/55 L  03/07/18 12:42


 


Pulse Ox  98   03/07/18 12:42








 





Orthostatic Blood Pressure [     73/53


Standing]                        


Orthostatic Blood Pressure [     78/56


Sitting]                         


Orthostatic Blood Pressure [     90/54


Supine]                          











- Orders/Labs/Meds


Orders: 


 Active Orders 24 hr











 Category Date Time Status


 


 EKG Documentation Completion [RC] STAT Care  03/07/18 10:31 Active


 


 Insert Ramirez Catheter [Insert Urinary Catheter] [OM.PC] Care  03/07/18 10:30 

Ordered





 Q24H   


 


 Orthostatic Vital Signs [RC] STAT Care  03/07/18 10:31 Active


 


 Urinary Catheter Assessment [RC] ASDIRECTED Care  03/07/18 10:30 Active











Labs: 


 Laboratory Tests











  03/07/18 03/07/18 03/07/18 Range/Units





  10:25 10:25 10:37 


 


WBC  7.75    (3.98-10.04)  K/mm3


 


RBC  4.17    (3.98-5.22)  M/mm3


 


Hgb  12.3    (11.2-15.7)  gm/L


 


Hct  38.0    (34.1-44.9)  %


 


MCV  91.1    (79.4-94.8)  fl


 


MCH  29.5    (25.6-32.2)  pg


 


MCHC  32.4    (32.2-35.5)  g/dl


 


RDW Std Deviation  46.0    (36.4-46.3)  fL


 


Plt Count  343    (182-369)  K/mm3


 


MPV  9.8    (9.4-12.3)  fl


 


Neutrophils % (Manual)  57    (40-60)  %


 


Band Neutrophils %  1    (0-10)  %


 


Lymphocytes % (Manual)  37    (20-40)  %


 


Atypical Lymphs %  0    %


 


Monocytes % (Manual)  2    (2-10)  %


 


Eosinophils % (Manual)  3    (0.7-5.8)  %


 


Basophils % (Manual)  0 L    (0.1-1.2)  


 


Platelet Estimate  Adequate    


 


RBC Morph Comment  Normal    


 


Sodium   135 L   (136-145)  mEq/L


 


Potassium   4.2   (3.5-5.1)  mEq/L


 


Chloride   99   ()  mEq/L


 


Carbon Dioxide   27   (21-32)  mEq/L


 


Anion Gap   13.2   (5-15)  


 


BUN   15   (7-18)  mg/dL


 


Creatinine   0.9   (0.55-1.02)  mg/dL


 


Est Cr Clr Drug Dosing   45.17   mL/min


 


Estimated GFR (MDRD)   > 60   (>60)  mL/min


 


BUN/Creatinine Ratio   16.7   (14-18)  


 


Glucose   94   ()  mg/dL


 


Calcium   8.1 L   (8.5-10.1)  mg/dL


 


Magnesium   1.8   (1.8-2.4)  mg/dl


 


Total Bilirubin   0.6   (0.2-1.0)  mg/dL


 


AST   15   (15-37)  U/L


 


ALT   20   (14-59)  U/L


 


Alkaline Phosphatase   55   ()  U/L


 


Total Protein   6.8   (6.4-8.2)  g/dl


 


Albumin   3.8   (3.4-5.0)  g/dl


 


Globulin   3.0   gm/dL


 


Albumin/Globulin Ratio   1.3   (1-2)  


 


Urine Color    Yellow  (Yellow)  


 


Urine Appearance    Clear  (Clear)  


 


Urine pH    5.5  (5.0-8.0)  


 


Ur Specific Gravity    1.015  (1.005-1.030)  


 


Urine Protein    Negative  (Negative)  


 


Urine Glucose (UA)    Negative  (Negative)  


 


Urine Ketones    Negative  (Negative)  


 


Urine Occult Blood    Negative  (Negative)  


 


Urine Nitrite    Negative  (Negative)  


 


Urine Bilirubin    Negative  (Negative)  


 


Urine Urobilinogen    0.2  (0.2-1.0)  


 


Ur Leukocyte Esterase    Negative  (Negative)  


 


Urine RBC    Not seen  (0-5)  /hpf


 


Urine WBC    0-5  (0-5)  /hpf


 


Ur Epithelial Cells    0-5  (0-5)  /hpf


 


Urine Bacteria    Few  (FEW)  /hpf


 


Urine Mucus    Not seen  (FEW)  /hpf


 


C.difficile 027-NAP1-B1     


 


C. difficile Tox (PCR)     














  03/07/18 Range/Units





  11:26 


 


WBC   (3.98-10.04)  K/mm3


 


RBC   (3.98-5.22)  M/mm3


 


Hgb   (11.2-15.7)  gm/L


 


Hct   (34.1-44.9)  %


 


MCV   (79.4-94.8)  fl


 


MCH   (25.6-32.2)  pg


 


MCHC   (32.2-35.5)  g/dl


 


RDW Std Deviation   (36.4-46.3)  fL


 


Plt Count   (182-369)  K/mm3


 


MPV   (9.4-12.3)  fl


 


Neutrophils % (Manual)   (40-60)  %


 


Band Neutrophils %   (0-10)  %


 


Lymphocytes % (Manual)   (20-40)  %


 


Atypical Lymphs %   %


 


Monocytes % (Manual)   (2-10)  %


 


Eosinophils % (Manual)   (0.7-5.8)  %


 


Basophils % (Manual)   (0.1-1.2)  


 


Platelet Estimate   


 


RBC Morph Comment   


 


Sodium   (136-145)  mEq/L


 


Potassium   (3.5-5.1)  mEq/L


 


Chloride   ()  mEq/L


 


Carbon Dioxide   (21-32)  mEq/L


 


Anion Gap   (5-15)  


 


BUN   (7-18)  mg/dL


 


Creatinine   (0.55-1.02)  mg/dL


 


Est Cr Clr Drug Dosing   mL/min


 


Estimated GFR (MDRD)   (>60)  mL/min


 


BUN/Creatinine Ratio   (14-18)  


 


Glucose   ()  mg/dL


 


Calcium   (8.5-10.1)  mg/dL


 


Magnesium   (1.8-2.4)  mg/dl


 


Total Bilirubin   (0.2-1.0)  mg/dL


 


AST   (15-37)  U/L


 


ALT   (14-59)  U/L


 


Alkaline Phosphatase   ()  U/L


 


Total Protein   (6.4-8.2)  g/dl


 


Albumin   (3.4-5.0)  g/dl


 


Globulin   gm/dL


 


Albumin/Globulin Ratio   (1-2)  


 


Urine Color   (Yellow)  


 


Urine Appearance   (Clear)  


 


Urine pH   (5.0-8.0)  


 


Ur Specific Gravity   (1.005-1.030)  


 


Urine Protein   (Negative)  


 


Urine Glucose (UA)   (Negative)  


 


Urine Ketones   (Negative)  


 


Urine Occult Blood   (Negative)  


 


Urine Nitrite   (Negative)  


 


Urine Bilirubin   (Negative)  


 


Urine Urobilinogen   (0.2-1.0)  


 


Ur Leukocyte Esterase   (Negative)  


 


Urine RBC   (0-5)  /hpf


 


Urine WBC   (0-5)  /hpf


 


Ur Epithelial Cells   (0-5)  /hpf


 


Urine Bacteria   (FEW)  /hpf


 


Urine Mucus   (FEW)  /hpf


 


C.difficile 027-NAP1-B1  Presumptive negative  


 


C. difficile Tox (PCR)  Positive H  











Meds: 


Medications














Discontinued Medications














Generic Name Dose Route Start Last Admin





  Trade Name Freq  PRN Reason Stop Dose Admin


 


Hydromorphone HCl  0.5 mg  03/07/18 11:31  03/07/18 11:41





  Dilaudid  IVPUSH  03/07/18 11:32  0.5 mg





  ONETIME STA   Administration


 


Metoclopramide HCl  10 mg  03/07/18 11:32  03/07/18 11:39





  Reglan  IVPUSH  03/07/18 11:33  10 mg





  ONETIME STA   Administration


 


Metronidazole  500 mg  03/07/18 12:25  03/07/18 12:41





  Flagyl  PO  03/07/18 12:26  500 mg





  ONETIME STA   Administration














- Re-Assessments/Exams


Free Text/Narrative Re-Assessment/Exam: 





03/07/18 11:27


The patient is not orthostatic.








03/07/18 11:33


Notified that the patient is requesting something for pain, and Reglan. The 

patient takes oxycodone 5 mg po Q6 hrs for chronic back pain. I have ordered IV 

Dilaudid and IV Reglan.








03/07/18 12:25


The patient's stool sample has returned positive for toxigenic C. difficile. I 

will start her on oral Flagyl and prescribe a ten-day course.





Departure





- Departure


Time of Disposition: 12:30


Disposition: Home, Self-Care 01


Condition: Fair


Clinical Impression: 


 Clostridium difficile diarrhea








- Discharge Information


Prescriptions: 


metroNIDAZOLE [Flagyl] 1 tab PO Q8H #29 tab


Instructions:  Clostridium Difficile Infection, Easy-to-Read


Referrals: 


PCP,Unknown [Primary Care Provider] - 


Forms:  ED Department Discharge


Additional Instructions: 


You were seen in the emergency room for diarrhea, nausea, vomiting, and 

abdominal cramps.





Workup in the ER included blood work, a urinalysis, a stool study, an ECG, and 

positional blood pressure checks.





Your stool study returned positive for Clostridium difficile. The remainder of 

your workup was unremarkable.





You have been started on the antibiotic Flagyl. Take one tablet every 8 hours, 

as prescribed. Finish the entire prescription unless told otherwise by your 

doctor.





Follow-up with your [facility] doctor this week or next.





If any other problems, please do not hesitate to return to the ER.





- My Orders


Last 24 Hours: 


My Active Orders





03/07/18 10:30


Insert Ramirez Catheter [Insert Urinary Catheter] [OM.PC] Q24H 


Urinary Catheter Assessment [RC] ASDIRECTED 





03/07/18 10:31


EKG Documentation Completion [RC] STAT 


Orthostatic Vital Signs [RC] STAT 














- Assessment/Plan


Last 24 Hours: 


My Active Orders





03/07/18 10:30


Insert Ramirez Catheter [Insert Urinary Catheter] [OM.PC] Q24H 


Urinary Catheter Assessment [RC] ASDIRECTED 





03/07/18 10:31


EKG Documentation Completion [RC] STAT 


Orthostatic Vital Signs [RC] STAT